# Patient Record
Sex: FEMALE | Race: WHITE | NOT HISPANIC OR LATINO | Employment: OTHER | URBAN - METROPOLITAN AREA
[De-identification: names, ages, dates, MRNs, and addresses within clinical notes are randomized per-mention and may not be internally consistent; named-entity substitution may affect disease eponyms.]

---

## 2016-11-16 LAB — TSH SERPL-ACNC: 4.29 UIU/ML (ref 0.36–3.74)

## 2017-05-01 ENCOUNTER — TRANSFERRED RECORDS (OUTPATIENT)
Dept: HEALTH INFORMATION MANAGEMENT | Facility: CLINIC | Age: 67
End: 2017-05-01

## 2017-05-26 ENCOUNTER — TRANSFERRED RECORDS (OUTPATIENT)
Dept: HEALTH INFORMATION MANAGEMENT | Facility: CLINIC | Age: 67
End: 2017-05-26

## 2018-02-07 ENCOUNTER — TRANSFERRED RECORDS (OUTPATIENT)
Dept: HEALTH INFORMATION MANAGEMENT | Facility: CLINIC | Age: 68
End: 2018-02-07

## 2018-02-07 LAB — TSH SERPL-ACNC: 5.42 UIU/ML (ref 0.36–3.74)

## 2018-07-06 NOTE — PROGRESS NOTES
SUBJECTIVE:   Erinn Zamudio is a 68 year old female who presents for Preventive Visit.      Are you in the first 12 months of your Medicare coverage?  No    Physical   Annual:     Getting at least 3 servings of Calcium per day:  NO    Bi-annual eye exam:  Yes    Dental care twice a year:  Yes    Sleep apnea or symptoms of sleep apnea:  None    Diet:  Regular (no restrictions)    Frequency of exercise:  6-7 days/week    Duration of exercise:  30-45 minutes    Taking medications regularly:  Not Applicable    Additional concerns today:  No    Ability to successfully perform activities of daily living: no assistance needed    Home Safety:  No safety concerns identified    Hearing Impairment: difficulty understanding soft or whispered speech        Fall risk:  Fallen 2 or more times in the past year?: No  Any fall with injury in the past year?: No    COGNITIVE SCREEN  1) Repeat 3 items (Leader, Season, Table)    2) Clock draw: NORMAL  3) 3 item recall: Recalls 3 objects  Results: 3 items recalled: COGNITIVE IMPAIRMENT LESS LIKELY    Mini-CogTM Copyright KERRY Pacheco. Licensed by the author for use in [facility]; reprinted with permission (salty@[facility]). All rights reserved.        Reviewed and updated as needed this visit by clinical staff  Tobacco  Allergies  Meds  Problems  Med Hx  Surg Hx  Fam Hx  Soc Hx          Reviewed and updated as needed this visit by Provider  Allergies  Meds  Problems        Social History   Substance Use Topics     Smoking status: Never Smoker     Smokeless tobacco: Never Used     Alcohol use No       No flowsheet data found.No flowsheet data found.            Today's PHQ-2 Score:   PHQ-2 ( 1999 Pfizer) 3/7/2016   Q1: Little interest or pleasure in doing things 0   Q2: Feeling down, depressed or hopeless 0   PHQ-2 Score 0       Do you feel safe in your environment - Yes    Do you have a Health Care Directive?: No: Advance care planning reviewed with patient; information given  to patient to review.    Current providers sharing in care for this patient include:   Patient Care Team:  Brittnee Ayala MD as PCP - General    The following health maintenance items are reviewed in Epic and correct as of today:  Health Maintenance   Topic Date Due     TETANUS IMMUNIZATION (SYSTEM ASSIGNED)  01/27/1968     MAMMO SCREEN Q2 YR (SYSTEM ASSIGNED)  01/27/2000     FALL RISK ASSESSMENT  01/27/2015     DEXA SCAN SCREENING (SYSTEM ASSIGNED)  01/27/2015     PNEUMOCOCCAL (1 of 2 - PCV13) 01/27/2015     LIPID SCREEN Q5 YR FEMALE (SYSTEM ASSIGNED)  11/01/2016     PHQ-2 Q1 YR  03/07/2017     INFLUENZA VACCINE (1) 09/01/2018     ADVANCE DIRECTIVE PLANNING Q5 YRS  12/16/2018     COLON CANCER SCREEN (SYSTEM ASSIGNED)  09/13/2023     HEPATITIS C SCREENING  Completed     BP Readings from Last 3 Encounters:   07/11/18 118/82   03/07/16 140/70   01/25/16 126/76    Wt Readings from Last 3 Encounters:   07/11/18 155 lb 8 oz (70.5 kg)   03/07/16 148 lb 14.4 oz (67.5 kg)   01/25/16 146 lb 11.2 oz (66.5 kg)                    Pneumonia Vaccine:Adults age 65+ who have not received previous Pneumovax (PPSV23) or PCV13 as an adult: Should first be given PCV13 AND then should be given PPSV23 6-12 months after PCV13    Review of Systems  CONSTITUTIONAL: NEGATIVE for fever, chills, change in weight  INTEGUMENTARY/SKIN: NEGATIVE for worrisome rashes, moles or lesions  EYES: NEGATIVE for vision changes or irritation  ENT/MOUTH: NEGATIVE for ear, mouth and throat problems  RESP: NEGATIVE for significant cough or SOB  BREAST: NEGATIVE for masses, tenderness or discharge  CV: NEGATIVE for chest pain, palpitations or peripheral edema  GI: NEGATIVE for nausea, abdominal pain, heartburn, or change in bowel habits  : NEGATIVE for frequency, dysuria, or hematuria  MUSCULOSKELETAL: NEGATIVE for significant arthralgias or myalgia  NEURO: NEGATIVE for weakness, dizziness or paresthesias  ENDOCRINE: NEGATIVE for temperature intolerance,  "skin/hair changes  HEME: NEGATIVE for bleeding problems  PSYCHIATRIC: NEGATIVE for changes in mood or affect    OBJECTIVE:   /82  Pulse 92  Temp 97.1  F (36.2  C) (Temporal)  Resp 16  Ht 5' 4.17\" (1.63 m)  Wt 155 lb 8 oz (70.5 kg)  BMI 26.55 kg/m2 Estimated body mass index is 26.55 kg/(m^2) as calculated from the following:    Height as of this encounter: 5' 4.17\" (1.63 m).    Weight as of this encounter: 155 lb 8 oz (70.5 kg).  Physical Exam  GENERAL APPEARANCE: healthy, alert and no distress  EYES: Eyes grossly normal to inspection, PERRL and conjunctivae and sclerae normal  HENT: ear canals and TM's normal, nose and mouth without ulcers or lesions, oropharynx clear and oral mucous membranes moist  NECK: no adenopathy, no asymmetry, masses, or scars and thyroid normal to palpation  RESP: lungs clear to auscultation - no rales, rhonchi or wheezes  BREAST: declined. Done with her ob/gyn  CV: regular rate and rhythm, normal S1 S2, no S3 or S4, no murmur, click or rub, no peripheral edema and peripheral pulses strong  ABDOMEN: soft, nontender, no hepatosplenomegaly, no masses and bowel sounds normal   (female): declined. See ob/gyn  MS: no musculoskeletal defects are noted and gait is age appropriate without ataxia  SKIN: no suspicious lesions or rashes  NEURO: Normal strength and tone, sensory exam grossly normal, mentation intact and speech normal  PSYCH: mentation appears normal and affect normal/bright    Diagnostic Test Results:  Results for orders placed or performed in visit on 07/11/18 (from the past 24 hour(s))   CBC with platelets differential   Result Value Ref Range    WBC 5.1 4.0 - 11.0 10e9/L    RBC Count 4.54 3.8 - 5.2 10e12/L    Hemoglobin 13.7 11.7 - 15.7 g/dL    Hematocrit 41.5 35.0 - 47.0 %    MCV 91 78 - 100 fl    MCH 30.2 26.5 - 33.0 pg    MCHC 33.0 31.5 - 36.5 g/dL    RDW 13.6 10.0 - 15.0 %    Platelet Count 233 150 - 450 10e9/L    Diff Method Automated Method     % Neutrophils 54.4 " "%    % Lymphocytes 30.1 %    % Monocytes 11.8 %    % Eosinophils 3.1 %    % Basophils 0.6 %    Absolute Neutrophil 2.8 1.6 - 8.3 10e9/L    Absolute Lymphocytes 1.5 0.8 - 5.3 10e9/L    Absolute Monocytes 0.6 0.0 - 1.3 10e9/L    Absolute Eosinophils 0.2 0.0 - 0.7 10e9/L    Absolute Basophils 0.0 0.0 - 0.2 10e9/L       ASSESSMENT / PLAN:   1. Medicare annual wellness visit, initial  See counseling messages     2. Lipid screening  Awaiting results. Will notify of results.   - Lipid panel reflex to direct LDL Fasting    3. S/P partial thyroidectomy  Doing well. No concerns.     4. Screening for diabetes mellitus  Awaiting results. Will notify of results.   - Comprehensive metabolic panel (BMP + Alb, Alk Phos, ALT, AST, Total. Bili, TP)    5. Screening, anemia, deficiency, iron  Awaiting results. Will notify of results.   - CBC with platelets differential  - Iron and iron binding capacity  - Ferritin    6. Screening for thyroid disorder  Awaiting results. Will notify of results.   - **TSH with free T4 reflex FUTURE 1yr    7. Menopausal state  Discussed Dexa. Declines. \"I'm never going to take medication for it anyway. I already spoke with my gynecologist\"    8. Screening for condition  Awaiting results. Will notify of results.   - Comprehensive metabolic panel (BMP + Alb, Alk Phos, ALT, AST, Total. Bili, TP)    End of Life Planning:  Patient currently has an advanced directive: Yes.  Practitioner is supportive of decision.    COUNSELING:  Reviewed preventive health counseling, as reflected in patient instructions  Special attention given to:       Regular exercise       Healthy diet/nutrition       Osteoporosis Prevention/Bone Health    BP Readings from Last 1 Encounters:   07/11/18 118/82     Estimated body mass index is 26.55 kg/(m^2) as calculated from the following:    Height as of this encounter: 5' 4.17\" (1.63 m).    Weight as of this encounter: 155 lb 8 oz (70.5 kg).           reports that she has never smoked. She " has never used smokeless tobacco.      Appropriate preventive services were discussed with this patient, including applicable screening as appropriate for cardiovascular disease, diabetes, osteopenia/osteoporosis, and glaucoma.  As appropriate for age/gender, discussed screening for colorectal cancer, prostate cancer, breast cancer, and cervical cancer. Checklist reviewing preventive services available has been given to the patient.    Reviewed patients plan of care and provided an AVS. The Basic Care Plan (routine screening as documented in Health Maintenance) for Erinn meets the Care Plan requirement. This Care Plan has been established and reviewed with the Patient.    Counseling Resources:  ATP IV Guidelines  Pooled Cohorts Equation Calculator  Breast Cancer Risk Calculator  FRAX Risk Assessment  ICSI Preventive Guidelines  Dietary Guidelines for Americans, 2010  USDA's MyPlate  ASA Prophylaxis  Lung CA Screening    AAKASH TRUONG MD, MD  [facility]

## 2018-07-11 ENCOUNTER — OFFICE VISIT (OUTPATIENT)
Dept: FAMILY MEDICINE | Facility: CLINIC | Age: 68
End: 2018-07-11
Payer: COMMERCIAL

## 2018-07-11 VITALS
TEMPERATURE: 97.1 F | RESPIRATION RATE: 16 BRPM | SYSTOLIC BLOOD PRESSURE: 118 MMHG | HEART RATE: 92 BPM | BODY MASS INDEX: 26.55 KG/M2 | DIASTOLIC BLOOD PRESSURE: 82 MMHG | HEIGHT: 64 IN | WEIGHT: 155.5 LBS

## 2018-07-11 DIAGNOSIS — Z13.29 SCREENING FOR THYROID DISORDER: ICD-10-CM

## 2018-07-11 DIAGNOSIS — Z13.0 SCREENING, ANEMIA, DEFICIENCY, IRON: ICD-10-CM

## 2018-07-11 DIAGNOSIS — Z00.00 MEDICARE ANNUAL WELLNESS VISIT, INITIAL: Primary | ICD-10-CM

## 2018-07-11 DIAGNOSIS — N95.1 MENOPAUSAL STATE: ICD-10-CM

## 2018-07-11 DIAGNOSIS — Z13.9 SCREENING FOR CONDITION: ICD-10-CM

## 2018-07-11 DIAGNOSIS — Z13.220 LIPID SCREENING: ICD-10-CM

## 2018-07-11 DIAGNOSIS — E89.0 S/P PARTIAL THYROIDECTOMY: ICD-10-CM

## 2018-07-11 DIAGNOSIS — Z13.1 SCREENING FOR DIABETES MELLITUS: ICD-10-CM

## 2018-07-11 LAB
ALBUMIN SERPL-MCNC: 3.7 G/DL (ref 3.4–5)
ALP SERPL-CCNC: 82 U/L (ref 40–150)
ALT SERPL W P-5'-P-CCNC: 35 U/L (ref 0–50)
ANION GAP SERPL CALCULATED.3IONS-SCNC: 7 MMOL/L (ref 3–14)
AST SERPL W P-5'-P-CCNC: 29 U/L (ref 0–45)
BASOPHILS # BLD AUTO: 0 10E9/L (ref 0–0.2)
BASOPHILS NFR BLD AUTO: 0.6 %
BILIRUB SERPL-MCNC: 0.5 MG/DL (ref 0.2–1.3)
BUN SERPL-MCNC: 16 MG/DL (ref 7–30)
CALCIUM SERPL-MCNC: 8.7 MG/DL (ref 8.5–10.1)
CHLORIDE SERPL-SCNC: 107 MMOL/L (ref 94–109)
CHOLEST SERPL-MCNC: 247 MG/DL
CO2 SERPL-SCNC: 26 MMOL/L (ref 20–32)
CREAT SERPL-MCNC: 0.72 MG/DL (ref 0.52–1.04)
DIFFERENTIAL METHOD BLD: NORMAL
EOSINOPHIL # BLD AUTO: 0.2 10E9/L (ref 0–0.7)
EOSINOPHIL NFR BLD AUTO: 3.1 %
ERYTHROCYTE [DISTWIDTH] IN BLOOD BY AUTOMATED COUNT: 13.6 % (ref 10–15)
FERRITIN SERPL-MCNC: 73 NG/ML (ref 8–252)
GFR SERPL CREATININE-BSD FRML MDRD: 81 ML/MIN/1.7M2
GLUCOSE SERPL-MCNC: 92 MG/DL (ref 70–99)
HCT VFR BLD AUTO: 41.5 % (ref 35–47)
HDLC SERPL-MCNC: 50 MG/DL
HGB BLD-MCNC: 13.7 G/DL (ref 11.7–15.7)
IRON SATN MFR SERPL: 30 % (ref 15–46)
IRON SERPL-MCNC: 102 UG/DL (ref 35–180)
LDLC SERPL CALC-MCNC: 167 MG/DL
LYMPHOCYTES # BLD AUTO: 1.5 10E9/L (ref 0.8–5.3)
LYMPHOCYTES NFR BLD AUTO: 30.1 %
MCH RBC QN AUTO: 30.2 PG (ref 26.5–33)
MCHC RBC AUTO-ENTMCNC: 33 G/DL (ref 31.5–36.5)
MCV RBC AUTO: 91 FL (ref 78–100)
MONOCYTES # BLD AUTO: 0.6 10E9/L (ref 0–1.3)
MONOCYTES NFR BLD AUTO: 11.8 %
NEUTROPHILS # BLD AUTO: 2.8 10E9/L (ref 1.6–8.3)
NEUTROPHILS NFR BLD AUTO: 54.4 %
NONHDLC SERPL-MCNC: 197 MG/DL
PLATELET # BLD AUTO: 233 10E9/L (ref 150–450)
POTASSIUM SERPL-SCNC: 4.8 MMOL/L (ref 3.4–5.3)
PROT SERPL-MCNC: 7.6 G/DL (ref 6.8–8.8)
RBC # BLD AUTO: 4.54 10E12/L (ref 3.8–5.2)
SODIUM SERPL-SCNC: 140 MMOL/L (ref 133–144)
TIBC SERPL-MCNC: 338 UG/DL (ref 240–430)
TRIGL SERPL-MCNC: 152 MG/DL
TSH SERPL DL<=0.005 MIU/L-ACNC: 3.74 MU/L (ref 0.4–4)
WBC # BLD AUTO: 5.1 10E9/L (ref 4–11)

## 2018-07-11 PROCEDURE — 84443 ASSAY THYROID STIM HORMONE: CPT | Performed by: FAMILY MEDICINE

## 2018-07-11 PROCEDURE — 80061 LIPID PANEL: CPT | Performed by: FAMILY MEDICINE

## 2018-07-11 PROCEDURE — 83550 IRON BINDING TEST: CPT | Performed by: FAMILY MEDICINE

## 2018-07-11 PROCEDURE — 82728 ASSAY OF FERRITIN: CPT | Performed by: FAMILY MEDICINE

## 2018-07-11 PROCEDURE — 83540 ASSAY OF IRON: CPT | Performed by: FAMILY MEDICINE

## 2018-07-11 PROCEDURE — 85025 COMPLETE CBC W/AUTO DIFF WBC: CPT | Performed by: FAMILY MEDICINE

## 2018-07-11 PROCEDURE — 80053 COMPREHEN METABOLIC PANEL: CPT | Performed by: FAMILY MEDICINE

## 2018-07-11 PROCEDURE — 99397 PER PM REEVAL EST PAT 65+ YR: CPT | Performed by: FAMILY MEDICINE

## 2018-07-11 PROCEDURE — 36415 COLL VENOUS BLD VENIPUNCTURE: CPT | Performed by: FAMILY MEDICINE

## 2018-07-11 ASSESSMENT — ACTIVITIES OF DAILY LIVING (ADL): CURRENT_FUNCTION: NO ASSISTANCE NEEDED

## 2018-07-11 ASSESSMENT — PAIN SCALES - GENERAL: PAINLEVEL: NO PAIN (0)

## 2018-07-11 NOTE — MR AVS SNAPSHOT
After Visit Summary   7/11/2018    Erinn Zamudio    MRN: [id number]           Patient Information     Date Of Birth          1950        Visit Information        Provider Department      7/11/2018 9:00 AM Brittnee Ayala MD [facility]        Today's Diagnoses     Medicare annual wellness visit, initial    -  1    Lipid screening        S/P partial thyroidectomy        Screening for diabetes mellitus        Screening, anemia, deficiency, iron        Screening for thyroid disorder        Menopausal state        Screening for condition          Care Instructions      Preventive Health Recommendations    Female Ages 65 +    Yearly exam:     See your health care provider every year in order to  o Review health changes.   o Discuss preventive care.    o Review your medicines if your doctor has prescribed any.      You no longer need a yearly Pap test unless you've had an abnormal Pap test in the past 10 years. If you have vaginal symptoms, such as bleeding or discharge, be sure to talk with your provider about a Pap test.      Every 1 to 2 years, have a mammogram.  If you are over 69, talk with your health care provider about whether or not you want to continue having screening mammograms.      Every 10 years, have a colonoscopy. Or, have a yearly FIT test (stool test). These exams will check for colon cancer.       Have a cholesterol test every 5 years, or more often if your doctor advises it.       Have a diabetes test (fasting glucose) every three years. If you are at risk for diabetes, you should have this test more often.       At age 65, have a bone density scan (DEXA) to check for osteoporosis (brittle bone disease).    Shots:    Get a flu shot each year.    Get a tetanus shot every 10 years.    Talk to your doctor about your pneumonia vaccines. There are now two you should receive - Pneumovax (PPSV 23) and Prevnar (PCV 13).    Talk to your pharmacist about the shingles  vaccine.    Talk to your doctor about the hepatitis B vaccine.    Nutrition:     Eat at least 5 servings of fruits and vegetables each day.      Eat whole-grain bread, whole-wheat pasta and brown rice instead of white grains and rice.      Get adequate Calcium and Vitamin D.     Lifestyle    Exercise at least 150 minutes a week (30 minutes a day, 5 days a week). This will help you control your weight and prevent disease.      Limit alcohol to one drink per day.      No smoking.       Wear sunscreen to prevent skin cancer.       See your dentist twice a year for an exam and cleaning.      See your eye doctor every 1 to 2 years to screen for conditions such as glaucoma, macular degeneration and cataracts.          Follow-ups after your visit        Follow-up notes from your care team     Return in about 1 year (around 7/11/2019) for Physical Exam.      Who to contact     If you have questions or need follow up information about today's clinic visit or your schedule please contact [facility] directly at [phone].  Normal or non-critical lab and imaging results will be communicated to you by [employer], letter or phone within 4 business days after the clinic has received the results. If you do not hear from us within 7 days, please contact the clinic through [email or web address] or phone. If you have a critical or abnormal lab result, we will notify you by phone as soon as possible.  Submit refill requests through [email or web address] or call your pharmacy and they will forward the refill request to us. Please allow 3 business days for your refill to be completed.          Additional Information About Your Visit        [employer] Information     [email or web address] gives you secure access to your electronic health record. If you see a primary care provider, you can also send messages to your care team and make appointments. If you have questions, please call your primary care clinic.  If you do not have a primary care provider, please call  "[phone] and they will assist you.        Care EveryWhere ID     This is your Care EveryWhere ID. This could be used by other organizations to access your [place] medical records  [id number]        Your Vitals Were     Pulse Temperature Respirations Height BMI (Body Mass Index)       92 97.1  F (36.2  C) (Temporal) 16 5' 4.17\" (1.63 m) 26.55 kg/m2        Blood Pressure from Last 3 Encounters:   07/11/18 118/82   03/07/16 140/70   01/25/16 126/76    Weight from Last 3 Encounters:   07/11/18 155 lb 8 oz (70.5 kg)   03/07/16 148 lb 14.4 oz (67.5 kg)   01/25/16 146 lb 11.2 oz (66.5 kg)              We Performed the Following     **TSH with free T4 reflex FUTURE 1yr     CBC with platelets differential     Comprehensive metabolic panel (BMP + Alb, Alk Phos, ALT, AST, Total. Bili, TP)     Ferritin     Iron and iron binding capacity     Lipid panel reflex to direct LDL Fasting          Today's Medication Changes          These changes are accurate as of 7/11/18  9:30 AM.  If you have any questions, ask your nurse or doctor.               Stop taking these medicines if you haven't already. Please contact your care team if you have questions.     omeprazole 20 MG tablet   Stopped by:  Brittnee Ayala MD                    Primary Care Provider Office Phone # Fax #    Brittnee Ayala [phone] [address] [facility] [address]        Equal Access to Services     [facility]: Hadii aad ku hadasho Soomaali, waaxda luqadaha, qaybta kaalmada adeegyada, rubina hong . So [facility] [phone].    ATENCIÓN: Si habla español, tiene a xiong disposición servicios gratuitos de asistencia lingüística. Llame al [phone].    We comply with applicable federal civil rights laws and [place] laws. We do not discriminate on the basis of race, color, national origin, age, disability, sex, sexual orientation, or gender identity.            Thank you!     Thank you for choosing " [facility]  for your care. Our goal is always to provide you with excellent care. Hearing back from our patients is one way we can continue to improve our services. Please take a few minutes to complete the written survey that you may receive in the mail after your visit with us. Thank you!             Your Updated Medication List - Protect others around you: Learn how to safely use, store and throw away your medicines at www.disposemymeds.org.          This list is accurate as of 7/11/18  9:30 AM.  Always use your most recent med list.                   Brand Name Dispense Instructions for use Diagnosis    B COMPLEX + C TR PO      Take  by mouth.        Cod Liver Oil 10 MINIM Caps      Take  by mouth.        MULTIVITAMIN ADULT PO

## 2018-08-03 PROBLEM — E89.0 S/P PARTIAL THYROIDECTOMY: Status: ACTIVE | Noted: 2018-08-03

## 2018-08-10 ENCOUNTER — TELEPHONE (OUTPATIENT)
Dept: FAMILY MEDICINE | Facility: CLINIC | Age: 68
End: 2018-08-10

## 2018-08-10 NOTE — TELEPHONE ENCOUNTER
Please abstract the following data from this visit with this patient into the appropriate field in Epic:    Mammogram done on this date: 05/2017 (approximately), by this group: [place] [facility], results were normal.     Stacy Fitzpatrick      Panel Management Review      Patient has the following on her problem list: None      Composite cancer screening  Chart review shows that this patient is due/due soon for the following Mammogram  Summary:    Patient is due/failing the following:   MAMMOGRAM    Action needed:   Schedule a mammogram     Type of outreach:    called [facility] in [place]. mammogram last completed on 05/2017 and was normal     Questions for provider review:    None                                                                                                                                    Stacy Fitzpatrick     Chart routed to Care Team .

## 2018-09-27 ENCOUNTER — TELEPHONE (OUTPATIENT)
Dept: FAMILY MEDICINE | Facility: CLINIC | Age: 68
End: 2018-09-27

## 2018-09-27 NOTE — TELEPHONE ENCOUNTER
Please see if willing to do an Evisit - can address today while out of the office. Can also do telephone visit tomorrow afternoon.

## 2018-09-27 NOTE — TELEPHONE ENCOUNTER
Reason for Call:  Same Day Appointment, Requested Provider:  Brittnee Ayala MD    PCP: Brittnee Ayala    Reason for visit: SINUS INFECTION ISSUE and pain in left eye    Duration of symptoms: 1 MONTH    Have you been treated for this in the past? No    Additional comments: Patient is scheduled for 10/3, but wants to be squeezed in sooner    Can we leave a detailed message on this number? YES    Phone number patient can be reached at: Home number on file [phone] (home) or Cell number on file:    Telephone Information:   Mobile [phone]       Best Time: any    Call taken on 9/27/2018 at 10:44 AM by Carline Ellsworth

## 2019-01-08 ENCOUNTER — OFFICE VISIT (OUTPATIENT)
Dept: FAMILY MEDICINE | Facility: CLINIC | Age: 69
End: 2019-01-08
Payer: COMMERCIAL

## 2019-01-08 VITALS
DIASTOLIC BLOOD PRESSURE: 80 MMHG | BODY MASS INDEX: 26.56 KG/M2 | HEART RATE: 108 BPM | WEIGHT: 155.6 LBS | OXYGEN SATURATION: 97 % | HEIGHT: 64 IN | TEMPERATURE: 97 F | RESPIRATION RATE: 16 BRPM | SYSTOLIC BLOOD PRESSURE: 134 MMHG

## 2019-01-08 DIAGNOSIS — R05.9 COUGH: ICD-10-CM

## 2019-01-08 DIAGNOSIS — J01.90 ACUTE SINUSITIS WITH SYMPTOMS > 10 DAYS: Primary | ICD-10-CM

## 2019-01-08 DIAGNOSIS — Z78.0 ASYMPTOMATIC POSTMENOPAUSAL STATUS: ICD-10-CM

## 2019-01-08 PROCEDURE — 99214 OFFICE O/P EST MOD 30 MIN: CPT | Performed by: PHYSICIAN ASSISTANT

## 2019-01-08 RX ORDER — METHYLPREDNISOLONE 4 MG
TABLET, DOSE PACK ORAL
Qty: 21 TABLET | Refills: 0 | Status: SHIPPED | OUTPATIENT
Start: 2019-01-08 | End: 2019-02-07

## 2019-01-08 RX ORDER — AMOXICILLIN 875 MG
875 TABLET ORAL 2 TIMES DAILY
Qty: 20 TABLET | Refills: 0 | Status: SHIPPED | OUTPATIENT
Start: 2019-01-08 | End: 2019-02-07

## 2019-01-08 RX ORDER — POLYMYXIN B SULFATE AND TRIMETHOPRIM 1; 10000 MG/ML; [USP'U]/ML
SOLUTION OPHTHALMIC
Refills: 0 | COMMUNITY
Start: 2018-09-12 | End: 2021-09-28

## 2019-01-08 ASSESSMENT — PAIN SCALES - GENERAL: PAINLEVEL: MILD PAIN (3)

## 2019-01-08 ASSESSMENT — MIFFLIN-ST. JEOR: SCORE: 1220.8

## 2019-01-08 NOTE — PROGRESS NOTES
"  SUBJECTIVE:   Erinn Zamudio is a 68 year old female who presents to clinic today for the following health issues:      HPI  Acute Illness   Acute illness concerns: URI   Onset: 2 weeks    Fever: no    Chills/Sweats: YES- sweats    Headache (location?): no    Sinus Pressure:YES    Conjunctivitis:  no    Ear Pain: YES: right    Rhinorrhea: no    Congestion: YES    Sore Throat: no     Cough: YES-non-productive    Wheeze: no    Decreased Appetite: no    Nausea: no    Vomiting: no    Diarrhea:  no    Dysuria/Freq.: no    Fatigue/Achiness: YES    Sick/Strep Exposure: YES- sick baby     Therapies Tried and outcome: ibuprofen/ aspirin : didn't really help  Amoxicillin script left over so she took it    Patient has been having URI symptoms for over 2 weeks and states that the symptoms have settled into her chest. She had fevers initially which have resolved but continues to have worsening chest congestion and a cough. She did have some Augmentin that she had not finished from September so did take 3 days of that which she states did not help.        Problem list and histories reviewed & adjusted, as indicated.  Additional history: as documented    BP Readings from Last 3 Encounters:   01/08/19 134/80   07/11/18 118/82   03/07/16 140/70    Wt Readings from Last 3 Encounters:   01/08/19 70.6 kg (155 lb 9.6 oz)   07/11/18 70.5 kg (155 lb 8 oz)   03/07/16 67.5 kg (148 lb 14.4 oz)                    ROS:  Constitutional, HEENT, cardiovascular, pulmonary, gi and gu systems are negative, except as otherwise noted.    OBJECTIVE:     /80   Pulse 108   Temp 97  F (36.1  C) (Temporal)   Resp 16   Ht 1.626 m (5' 4\")   Wt 70.6 kg (155 lb 9.6 oz)   SpO2 97%   BMI 26.71 kg/m    Body mass index is 26.71 kg/m .  GENERAL: healthy, alert and no distress  EYES: Eyes grossly normal to inspection  HENT: normal cephalic/atraumatic, both ears: clear effusion, rhinorrhea yellow, oropharynx clear, oral mucous membranes moist and sinuses: " maxillary tenderness on both sides  NECK: bilateral anterior cervical adenopathy  RESP: lungs clear to auscultation - no rales, rhonchi or wheezes  CV: regular rates and rhythm, no murmur, click or rub, peripheral pulses strong and no peripheral edema  MS: no gross musculoskeletal defects noted, no edema  SKIN: no suspicious lesions or rashes    Diagnostic Test Results:  none     ASSESSMENT/PLAN:       ICD-10-CM    1. Acute sinusitis with symptoms > 10 days J01.90 amoxicillin (AMOXIL) 875 MG tablet     methylPREDNISolone (MEDROL DOSEPAK) 4 MG tablet therapy pack   2. Asymptomatic postmenopausal status Z78.0 DEXA HIP/PELVIS/SPINE - Future   3. Cough R05 methylPREDNISolone (MEDROL DOSEPAK) 4 MG tablet therapy pack       I will treat for sinus infection and have her start a steroid pack if chest congestion is not improving with antibiotic. Follow up in clinic if new or worsening symptoms.   See Patient Instructions    Sofy Garcia PA-C  [facility]

## 2019-01-08 NOTE — PATIENT INSTRUCTIONS
I will treat you for your sinus infection with amoxicillin and have you use home cares and possibly some Mucinex to help loosen the cough. I would have you use a steroid pack if chest wall pain and follow up in clinic if any fevers or worsening cough.

## 2019-02-07 ENCOUNTER — OFFICE VISIT (OUTPATIENT)
Dept: FAMILY MEDICINE | Facility: CLINIC | Age: 69
End: 2019-02-07
Payer: COMMERCIAL

## 2019-02-07 ENCOUNTER — ANCILLARY PROCEDURE (OUTPATIENT)
Dept: GENERAL RADIOLOGY | Facility: CLINIC | Age: 69
End: 2019-02-07
Attending: PHYSICIAN ASSISTANT
Payer: COMMERCIAL

## 2019-02-07 VITALS
BODY MASS INDEX: 26.26 KG/M2 | SYSTOLIC BLOOD PRESSURE: 150 MMHG | DIASTOLIC BLOOD PRESSURE: 88 MMHG | TEMPERATURE: 98.5 F | WEIGHT: 153 LBS | OXYGEN SATURATION: 97 % | HEART RATE: 92 BPM

## 2019-02-07 DIAGNOSIS — R05.9 COUGH: ICD-10-CM

## 2019-02-07 DIAGNOSIS — J20.9 ACUTE BRONCHITIS WITH SYMPTOMS > 10 DAYS: Primary | ICD-10-CM

## 2019-02-07 DIAGNOSIS — R07.89 CHEST TIGHTNESS: ICD-10-CM

## 2019-02-07 PROCEDURE — 71046 X-RAY EXAM CHEST 2 VIEWS: CPT | Mod: FY

## 2019-02-07 PROCEDURE — 99214 OFFICE O/P EST MOD 30 MIN: CPT | Performed by: PHYSICIAN ASSISTANT

## 2019-02-07 PROCEDURE — 93000 ELECTROCARDIOGRAM COMPLETE: CPT | Performed by: PHYSICIAN ASSISTANT

## 2019-02-07 RX ORDER — ALBUTEROL SULFATE 90 UG/1
2 AEROSOL, METERED RESPIRATORY (INHALATION) EVERY 6 HOURS
Qty: 1 INHALER | Refills: 0 | Status: SHIPPED | OUTPATIENT
Start: 2019-02-07 | End: 2019-11-13

## 2019-02-07 RX ORDER — INHALER, ASSIST DEVICES
SPACER (EA) MISCELLANEOUS
Qty: 1 EACH | Refills: 0 | Status: SHIPPED | OUTPATIENT
Start: 2019-02-07 | End: 2019-11-13

## 2019-02-07 ASSESSMENT — PAIN SCALES - GENERAL: PAINLEVEL: NO PAIN (0)

## 2019-02-07 NOTE — PROGRESS NOTES
"  SUBJECTIVE:   Erinn Zamudio is a 69 year old female who presents to clinic today for the following health issues:      HPI  Acute Illness   Acute illness concerns: cough   Onset: \"got the cough back about 1 week ago.\"   Was seen by Sofy Garcia 01/08/2019. Treated with amoxicillin. Felt better for a few weeks but continued to have a \"lingering annoying dry cough\".   Then 1 week ago woke with just a deep, loose cough. Couldn't get anything up. Metallic taste. Can hear it in chest. Feels like sinuses draining in back of throat. Cold air makes it worse  No fevers/chills/body aches. Some chest discomfort/tightness last night and today- upper chest.  Little SOB, little SOB with stairs at home. Got worried on drive over could be heart related.   Doesn't like to take medications.  Never smoker.  No asthma hx    Fever: no    Chills/Sweats: no    Headache (location?): no    Sinus Pressure:YES- post-nasal drainage \"metal taste in my mouth \"     Conjunctivitis:  no    Ear Pain: no    Rhinorrhea: no     Congestion: YES chest    Sore Throat: no      Cough: YES-non-productive, pressure in chest \"muscle pain\"     Wheeze: no     Decreased Appetite: no    Nausea: no     Vomiting: no     Diarrhea:  no     Dysuria/Freq.: no     Fatigue/Achiness: no     Sick/Strep Exposure: no     No swelling in legs. No rashes. No GI sx.        Therapies Tried and outcome: vicks, cough drops, pressure     Problem list and histories reviewed & adjusted, as indicated.  Additional history: as documented    Patient Active Problem List   Diagnosis     CARDIOVASCULAR SCREENING; LDL GOAL LESS THAN 160     Advanced directives, counseling/discussion     S/P partial thyroidectomy     Past Surgical History:   Procedure Laterality Date     BREAST SURGERY       CARDIAC SURGERY       COLONOSCOPY  9/13/2013    Procedure: COLONOSCOPY;  Screening Colon   Brittnee Ayala;  Surgeon: Brittnee Ayala MD;  Location: [facility] OR     partial thyroidectomy  2017     SURGICAL " "HISTORY OF -       left breast, cystectomy       Social History     Tobacco Use     Smoking status: Never Smoker     Smokeless tobacco: Never Used   Substance Use Topics     Alcohol use: No     Family History   Problem Relation Age of Onset     Cerebrovascular Disease Mother          at 59 with brain anyerism     C.A.D. Father      Genitourinary Problems Daughter         kidney transplant     Asthma No family hx of      Diabetes No family hx of      Hypertension No family hx of      Breast Cancer No family hx of      Cancer - colorectal No family hx of      Prostate Cancer No family hx of          Current Outpatient Medications   Medication Sig Dispense Refill     Cod Liver Oil 10 MINIM CAPS Take  by mouth.       Multiple Vitamins-Minerals (MULTIVITAMIN ADULT PO)        B Complex-C-Folic Acid (B COMPLEX + C TR PO) Take  by mouth.       trimethoprim-polymyxin b (POLYTRIM) 13665-4.1 UNIT/ML-% ophthalmic solution INSTILL 1 DROP INTO BOTH EYES 4 TIMES A DAY FOR 7 DAYS  0     Allergies   Allergen Reactions     Erythromycin      \"not feeling good\"     BP Readings from Last 3 Encounters:   19 150/88   19 134/80   18 118/82    Wt Readings from Last 3 Encounters:   19 69.4 kg (153 lb)   19 70.6 kg (155 lb 9.6 oz)   18 70.5 kg (155 lb 8 oz)                  Labs reviewed in EPIC    ROS:  Constitutional, HEENT, cardiovascular, pulmonary, gi and gu systems are negative, except as otherwise noted.    OBJECTIVE:     /88   Pulse 92   Temp 98.5  F (36.9  C) (Oral)   Wt 69.4 kg (153 lb)   SpO2 97%   BMI 26.26 kg/m    Body mass index is 26.26 kg/m .  GENERAL:mildly ill, alert and no distress, nontoxic, no respiratory distress  EYES: Eyes grossly normal to inspection, PERRL and conjunctivae and sclerae normal  HENT: Normal cephalic/atraumatic. Sinuses nontender. Ear canals clear and TM's normal, no effusion. Nose mucosa no erythema and turbinates normal. Lips normal, no lesions. " Buccal muscosa moist. Soft palate no lesions or ulcers. Tonsilar archs no erythema, tonsils normal, no exudates or erythema. Uvula midline. Posterior oropharynx normal erythema.   NECK: no adenopathy and no asymmetry, masses, or scars  RESP: deep, bronchial barking productive cough, subtle rhonchi - no rales or wheezes  CV: regular rate and rhythm, normal S1 S2, no S3 or S4, no murmur, click or rub  MSK: no LE edema, calf tenderness, normal pedal pulses  SKIN: no suspicious lesions or rashes      Diagnostic Test Results:  Xray -   Xr Chest 2 Views  Result Date: 2/7/2019  XR CHEST 2 VW   2/7/2019 1:59 PM HISTORY: Cough COMPARISON: 3/31/2016   IMPRESSION: No definite acute abnormality. SHAYY WIGGINS MD    EKG: NST, no st segment changes, no ectopy. compared to EKG from 03/07/2016- stable    ASSESSMENT/PLAN:     1. Acute bronchitis with symptoms > 10 days  Pt on amoxicillin within the past 30days. Preference would be for doxy or alternate family of meds. Pt sensitive to abx and does well w/amox and augmentin and requests starting w/augmentin.   Albuterol for cough, chest sx.  Follow up if sx not starting to improve early next week. Sooner if worsening.   - amoxicillin-clavulanate (AUGMENTIN) 875-125 MG tablet; Take 1 tablet by mouth 2 times daily for 10 days  Dispense: 20 tablet; Refill: 0  - albuterol (PROAIR HFA/PROVENTIL HFA/VENTOLIN HFA) 108 (90 Base) MCG/ACT inhaler; Inhale 2 puffs into the lungs every 6 hours  Dispense: 1 Inhaler; Refill: 0  - spacer (OPTICHAMBER CHELSEA) holding chamber; To be used with albuterol inhaler  Dispense: 1 each; Refill: 0    2. Cough  - XR Chest 2 Views; Future  - EKG 12-lead complete w/read - Clinics    3. Chest tightness  - EKG 12-lead complete w/read - Clinics    Follow Up: The patient was instructed to contact clinic for worsening symptoms, non-improvement as expected/discussed, and for questions regarding medications or treatment plan. Discussed parameters for follow up and  included in After Visit Summary given to patient.      Reena Gurrola PA-C  [facility]

## 2019-02-07 NOTE — PATIENT INSTRUCTIONS
Augmentin twice daily x 10 days  Take with food    Follow up early next week if not improving as expected    Inhaler- albuterol- helps relax airway to help with cough, chest tightness feeling.  2 puffs (use spacer) every 4-6 hrs for these symptoms      To be seen again urgently - if new fever 100.4 or higher, worsening shortness of breath, chest pain.

## 2019-02-19 ENCOUNTER — VIRTUAL VISIT (OUTPATIENT)
Dept: FAMILY MEDICINE | Facility: CLINIC | Age: 69
End: 2019-02-19
Payer: COMMERCIAL

## 2019-02-19 ENCOUNTER — TELEPHONE (OUTPATIENT)
Dept: FAMILY MEDICINE | Facility: CLINIC | Age: 69
End: 2019-02-19

## 2019-02-19 DIAGNOSIS — J01.00 SUBACUTE MAXILLARY SINUSITIS: Primary | ICD-10-CM

## 2019-02-19 PROCEDURE — 99442 ZZC PHYSICIAN TELEPHONE EVALUATION 11-20 MIN: CPT | Performed by: FAMILY MEDICINE

## 2019-02-19 NOTE — TELEPHONE ENCOUNTER
When I saw her 02/07/19 she was also having a lot of cough and chest sx (we even did chest xray and ekg)- that visit was more chest sx than sinus even at that time. She should prob have phone or office visit. Reena Gurrola PA-C

## 2019-02-19 NOTE — TELEPHONE ENCOUNTER
"Erinn Zamudio is a 69 year old female who calls with possible sinus infection.    NURSING ASSESSMENT:  Description:  Pt states her upper teeth were bothering her, mainly 1 tooth is more sensitive then the others.  Went to the dentist. Had x-ray done at dentist and they told her to call doctor, xray showed issues with her sinuses and not her teeth. Does not feel congested. Just has sinus pressure. No fevers. No swelling. No HA. Discomfort gets worse when she bends over. When she lays down at night it is better. Feels sensitivity of her tooth mainly  Onset/duration:  Couple of days  Precip. factors:  Recently had Bronchitis  Associated symptoms:  Cough is gone, states it went away quickly after LOV 2/7/2019  Improves/worsens symptoms:  Has been doing saline rinses and steam  Pain scale (0-10)   Tooth sensitivity    Allergies:   Allergies   Allergen Reactions     Erythromycin      \"not feeling good\"     NURSING PLAN: Patient meets virtual visit criteria:  Yes,  Routed to schedule phone visit.    RECOMMENDED DISPOSITION:  Phone Visit  Will comply with recommendation: Yes  If further questions/concerns or if symptoms do not improve, worsen or new symptoms develop, call your PCP or [facility] as soon as possible.      Guideline used: Sinus problems  Telephone Triage Protocols for Nurses, Fifth Edition, Naya Alvarez RN    "

## 2019-02-19 NOTE — PROGRESS NOTES
Erinn Zamudio is a 69 year old female who is being evaluated via a billable telephone visit.      The patient has been notified of following:         Consent has been obtained for this service by 1 care team member: yes. See the scanned image in the medical record.    Erinn Zamudio complains of    Chief Complaint   Patient presents with     Sinus Problem       I have reviewed and updated the patient's Past Medical History, Social History, Family History and Medication List.    ALLERGIES  Erythromycin    Sammie Douglass MA    (MA signature)      Spoke with Erinn.  In January, had cough and cold. Treated for sinus infection. Had been around a grandchild.  Took amoxicillin. Didn't finish the course. Had felt better.   At the end of January, started with cough - loose.   Has not been getting better.   Thought to be bronchitis and was given augmentin and an inhaler.   Didn't like the inhaler - caused jitteriness. Used it once  Finished the augmentin.   Cough is now gone.     Feels like sinuses are irritated again. Has some discomfort upper teeth.   Saw the dentist and her dentist said she has sinusitis - left above the teeth.     She is wondering if she needs additional antibiotic.     Reports no plugging of her nose.   No fever.   Thick white nasal discharge.       Assessment/Plan:  (J01.00) Subacute maxillary sinusitis  (primary encounter diagnosis)  Augmentin 875 mg twice daily for another 10 days  Comment: Patient with symptoms consistent with sinusitis with known history of sinus infections. Patient has had symptoms for more than 10 days and has tried over the counter therapy appropriately and without improvement.   Recommend treatment with antibiotics as noted.   Continue to push fluids and rest.   Flonase nasal spray once daily.   Nasal saline rinses.   Recheck if fails to improve or if worsening in any way.     All questions invited, asked and answered to the patient's apparent satisfaction.  Patient agrees to plan.          Total time of call between patient and provider was 13 minutes

## 2019-02-19 NOTE — TELEPHONE ENCOUNTER
Spoke with patient.  Nothing is really different from when she was seen on 02/07/2019.  Went to the  dentist this am for tooth pain and feels pressure with sinus.  They did an Xray that showed the sinus are very full and white and the issues was not her teeth.  They advised she call the clinic for antibiotic.  Wondering if she can get another round of antibiotics?  States that she's getting thick and sticky white mucus.       Has been doing saline rinses and trying steam.  Does have an old Flonase but has not tried that.  States that she does not like taking OTC so has not tried a decongestant either.     [facility] in [place]    Please advise on plan or OV.    Juan Dennison, RN, BSN

## 2019-04-03 ENCOUNTER — TELEPHONE (OUTPATIENT)
Dept: FAMILY MEDICINE | Facility: CLINIC | Age: 69
End: 2019-04-03

## 2019-04-03 ENCOUNTER — OFFICE VISIT (OUTPATIENT)
Dept: FAMILY MEDICINE | Facility: CLINIC | Age: 69
End: 2019-04-03
Payer: COMMERCIAL

## 2019-04-03 VITALS
BODY MASS INDEX: 26.58 KG/M2 | HEIGHT: 64 IN | DIASTOLIC BLOOD PRESSURE: 80 MMHG | HEART RATE: 92 BPM | SYSTOLIC BLOOD PRESSURE: 158 MMHG | RESPIRATION RATE: 14 BRPM | WEIGHT: 155.7 LBS | TEMPERATURE: 97.3 F | OXYGEN SATURATION: 96 %
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
## 2019-04-03 DIAGNOSIS — J01.01 ACUTE RECURRENT MAXILLARY SINUSITIS: Primary | ICD-10-CM

## 2019-04-03 DIAGNOSIS — H57.12 LEFT EYE PAIN: ICD-10-CM

## 2019-04-03 LAB — ERYTHROCYTE [SEDIMENTATION RATE] IN BLOOD BY WESTERGREN METHOD: 12 MM/H (ref 0–30)

## 2019-04-03 PROCEDURE — 36415 COLL VENOUS BLD VENIPUNCTURE: CPT | Performed by: FAMILY MEDICINE

## 2019-04-03 PROCEDURE — 99214 OFFICE O/P EST MOD 30 MIN: CPT | Performed by: FAMILY MEDICINE

## 2019-04-03 PROCEDURE — 86618 LYME DISEASE ANTIBODY: CPT | Performed by: FAMILY MEDICINE

## 2019-04-03 PROCEDURE — 85652 RBC SED RATE AUTOMATED: CPT | Performed by: FAMILY MEDICINE

## 2019-04-03 RX ORDER — AMOXICILLIN 500 MG/1
CAPSULE ORAL
Refills: 0 | COMMUNITY
Start: 2019-03-29 | End: 2021-09-28

## 2019-04-03 ASSESSMENT — MIFFLIN-ST. JEOR: SCORE: 1216.25

## 2019-04-03 ASSESSMENT — PAIN SCALES - GENERAL: PAINLEVEL: SEVERE PAIN (7)

## 2019-04-03 NOTE — PROGRESS NOTES
"  SUBJECTIVE:   Erinn Zamudio is a 69 year old female who presents to clinic today for the following health issues:    HPI  Acute Illness   Acute illness concerns: Sinus problem  Onset: 3-4 weeks    Fever: no    Chills/Sweats: no    Headache (location?): YES    Sinus Pressure:YES    Conjunctivitis:  no    Ear Pain: YES- little pain    Rhinorrhea: no     Congestion: no     Sore Throat: no      Cough: no    Wheeze: no    Decreased Appetite: no     Nausea: no     Vomiting: no     Diarrhea:  no     Dysuria/Freq.: no     Fatigue/Achiness: no     Sick/Strep Exposure: YES- daughter     Therapies Tried and outcome: amoxicillin from  visit on 3/29 and neti pot  The patient states that she had similar symptoms before. She states that she was sick a few times over the Winter months.   She states it feels like someone is \"pinching\" her eyeball. She states that the feeling can move from both eyes, to one eye. She notes that the her eyes feel painful.   She states that her nose and mouth is very dry. She reports having a headache and ear pain as well. The patient states that she has sinus pressure.   She states that she used a Neti Pot, and there was a few \"gunks\" of white coming out, but otherwise it was clear.   She is wondering if she can have an x-ray of her Sinus done. She notes that her Mother had a history of sinus problems.     Concern about Lyme disease   The patient is wondering if she can get a Lyme test. She states that she got a tick bite last year, but never got the test done. She states that she got a \"weird reaction\" from the tick bite. She notes that bite was really red, then had two long tails leading from the tick bite. One line went towards the her back, and the other went towards her neck. She notes she had neck pain, shoulder pain and headaches after the tick bite. She is wondering if these current issues could be related.     Problem list and histories reviewed & adjusted, as indicated.  Additional history: " "as documented    Patient Active Problem List   Diagnosis     CARDIOVASCULAR SCREENING; LDL GOAL LESS THAN 160     Advanced directives, counseling/discussion     S/P partial thyroidectomy     Past Surgical History:   Procedure Laterality Date     BREAST SURGERY       CARDIAC SURGERY       COLONOSCOPY  2013    Procedure: COLONOSCOPY;  Screening Colon   Brittnee Ayala;  Surgeon: Brittnee Ayala MD;  Location: [facility] OR     partial thyroidectomy       SURGICAL HISTORY OF -   1968    left breast, cystectomy       Social History     Tobacco Use     Smoking status: Never Smoker     Smokeless tobacco: Never Used   Substance Use Topics     Alcohol use: No     Family History   Problem Relation Age of Onset     Cerebrovascular Disease Mother          at 59 with brain anyerism     C.A.D. Father      Genitourinary Problems Daughter         kidney transplant     Asthma No family hx of      Diabetes No family hx of      Hypertension No family hx of      Breast Cancer No family hx of      Cancer - colorectal No family hx of      Prostate Cancer No family hx of          Current Outpatient Medications   Medication Sig Dispense Refill     amoxicillin (AMOXIL) 500 MG capsule TAKE 1 CAPSULE (500 MG) BY MOUTH THREE TIMES A DAY FOR 10 DAYS.  0     Cod Liver Oil 10 MINIM CAPS Take  by mouth.       Multiple Vitamins-Minerals (MULTIVITAMIN ADULT PO)        trimethoprim-polymyxin b (POLYTRIM) 97395-0.1 UNIT/ML-% ophthalmic solution INSTILL 1 DROP INTO BOTH EYES 4 TIMES A DAY FOR 7 DAYS  0     albuterol (PROAIR HFA/PROVENTIL HFA/VENTOLIN HFA) 108 (90 Base) MCG/ACT inhaler Inhale 2 puffs into the lungs every 6 hours (Patient not taking: Reported on 2019) 1 Inhaler 0     B Complex-C-Folic Acid (B COMPLEX + C TR PO) Take  by mouth.       spacer (OPTICHAMBER CHELSEA) holding chamber To be used with albuterol inhaler (Patient not taking: Reported on 2019) 1 each 0     Allergies   Allergen Reactions     Erythromycin      \"not " "feeling good\"     Recent Labs   Lab Test 07/11/18  0933 02/07/18 03/07/16  1527 11/01/11  1010   *  --   --   --  192*   HDL 50  --   --   --  52   TRIG 152*  --   --   --  106   ALT 35  --   --  41  --    CR 0.72  --   --  0.71  --    GFRESTIMATED 81  --   --  83  --    GFRESTBLACK >90  --   --  >90  African American GFR Calc    --    POTASSIUM 4.8  --   --  3.9  --    TSH 3.74 5.42*   < > 0.85 1.08    < > = values in this interval not displayed.      BP Readings from Last 3 Encounters:   04/03/19 158/80   02/07/19 150/88   01/08/19 134/80    Wt Readings from Last 3 Encounters:   04/03/19 70.6 kg (155 lb 11.2 oz)   02/07/19 69.4 kg (153 lb)   01/08/19 70.6 kg (155 lb 9.6 oz)        ROS:  CONSTITUTIONAL: NEGATIVE for fever, chills, change in weight  ENT/MOUTH: NEGATIVE for throat problems; POSITIVE for sinus pressure, ear pain, dry eyes, painful eyes, dry mouth and dry nose   RESP: NEGATIVE for significant cough or SOB  CV: NEGATIVE for chest pain, palpitations or peripheral edema  NEURO: NEGATIVE for weakness, dizziness or paresthesias; POSITIVE for headache     This document serves as a record of the services and decisions personally performed and made by Brittnee Ayala MD. It was created on her behalf by Deysi Caraballo, a trained medical scribe. The creation of this document is based the provider's statements to the medical scribe.    Deysi Caraballo April 3, 2019 12:08 PM  OBJECTIVE:     /80   Pulse 92   Temp 97.3  F (36.3  C) (Temporal)   Resp 14   Ht 1.626 m (5' 4\")   Wt 70.6 kg (155 lb 11.2 oz)   SpO2 96%   BMI 26.73 kg/m    Body mass index is 26.73 kg/m .  GENERAL: healthy, alert and no distress  HENT: ear canals and TM's normal, nose and mouth without ulcers or lesions, some mild erythema on nasal mucosa, no discharge or edema noted, no tenderness over the sinuses    NECK: no adenopathy, no asymmetry, masses, or scars and thyroid normal to palpation  RESP: lungs clear to auscultation " "- no rales, rhonchi or wheezes  CV: regular rate and rhythm, normal S1 S2, no S3 or S4, no murmur, click or rub, no peripheral edema and peripheral pulses strong    Diagnostic Test Results:  Results for orders placed or performed in visit on 04/03/19 (from the past 24 hour(s))   ESR: Erythrocyte sedimentation rate   Result Value Ref Range    Sed Rate 12 0 - 30 mm/h       ASSESSMENT/PLAN:   1. Acute recurrent maxillary sinusitis  Patient reports having sinus pressure, ear pain, dry eyes, painful eyes, dry mouth, dry nose and headaches.  She reports \"it feels like someone is pinching my eyeball\"- pain can move from one eye, to both eyes.   She reports described symptoms above started 4 weeks ago.   Patient reports having similar symptoms throughout Winter- still taking Amoxicillin from Urgent Care visit on March 29th, 2019.   She reports Mother had history of sinus problems.   Exam showed some mild erythema on nasal mucosa.  No discharge or edema noted.  No tenderness over the sinuses.  Discussed plan with patient.   Patient agreed.   CT order has been placed.   Patient will schedule appointment before leaving clinic.   Advised patient that she can continue use of Neti Pot- can use plain water if needed.   Symptoms patient experience could be due to Allergies. Evaluate for sinus lesions  Will evaluate CT scan and take appropriate next steps.   - CT Sinus w/o Contrast; Future    2. Tick bite, sequela  Patient reports having a tick bite last year- she expressed concern as to if she contracted Lyme Disease.   Explained it is unlikely  Discussed plan with patient.  Patient agreed.  Labs have been ordered.  Will notify with results.   - Lyme Disease Beth with reflex to WB Serum    3. Left eye pain  Patient reports having dry and painful eyes.   She reports \"it feels like someone is pinching my eyeball\"- pain can move from one eye, to both eyes.   She reports symptom above started 4 weeks ago.   Exam showed no abnormalities. "   Discussed plan with patient.  Patient agreed.   Labs have been ordered.  Awaiting results.   - ESR: Erythrocyte sedimentation rate    Follow up- Come back in 3 months for physical exam.     The information in this document, created by the medical scribe for me, accurately reflects the services I personally performed and the decisions made by me. I have reviewed and approved this document for accuracy prior to leaving the patient care area.    AAKASH TRUONG MD, MD  [facility]

## 2019-04-03 NOTE — TELEPHONE ENCOUNTER
Reason for Call:  Same Day Appointment, Requested Provider:  Brittnee Ayala MD    PCP: Brittnee Ayala    Reason for visit: f/u sinus infection    Duration of symptoms: ?    Have you been treated for this in the past? Yes    Additional comments: patient states she has been on several medications and nothing is helping. Would like to be seen today.    Can we leave a detailed message on this number? YES    Phone number patient can be reached at: Cell number on file:    Telephone Information:   Mobile [phone]       Best Time: anytime    Call taken on 4/3/2019 at 10:17 AM by Viry Car

## 2019-04-04 ENCOUNTER — ANCILLARY PROCEDURE (OUTPATIENT)
Dept: CT IMAGING | Facility: CLINIC | Age: 69
End: 2019-04-04
Attending: FAMILY MEDICINE
Payer: COMMERCIAL

## 2019-04-04 DIAGNOSIS — J01.01 ACUTE RECURRENT MAXILLARY SINUSITIS: ICD-10-CM

## 2019-04-04 LAB — B BURGDOR IGG+IGM SER QL: 0.03 (ref 0–0.89)

## 2019-04-04 PROCEDURE — 70486 CT MAXILLOFACIAL W/O DYE: CPT | Performed by: RADIOLOGY

## 2019-04-17 ENCOUNTER — TRANSFERRED RECORDS (OUTPATIENT)
Dept: HEALTH INFORMATION MANAGEMENT | Facility: CLINIC | Age: 69
End: 2019-04-17

## 2019-04-20 ENCOUNTER — TRANSFERRED RECORDS (OUTPATIENT)
Dept: HEALTH INFORMATION MANAGEMENT | Facility: CLINIC | Age: 69
End: 2019-04-20

## 2019-04-23 ENCOUNTER — TELEPHONE (OUTPATIENT)
Dept: FAMILY MEDICINE | Facility: CLINIC | Age: 69
End: 2019-04-23

## 2019-04-23 ENCOUNTER — MYC MEDICAL ADVICE (OUTPATIENT)
Dept: FAMILY MEDICINE | Facility: CLINIC | Age: 69
End: 2019-04-23

## 2019-04-23 ENCOUNTER — TRANSFERRED RECORDS (OUTPATIENT)
Dept: HEALTH INFORMATION MANAGEMENT | Facility: CLINIC | Age: 69
End: 2019-04-23

## 2019-04-23 DIAGNOSIS — J32.9 SINUSITIS, UNSPECIFIED CHRONICITY, UNSPECIFIED LOCATION: Primary | ICD-10-CM

## 2019-04-23 RX ORDER — AMOXICILLIN 875 MG
875 TABLET ORAL 2 TIMES DAILY
Qty: 20 TABLET | Refills: 0 | Status: SHIPPED | OUTPATIENT
Start: 2019-04-23 | End: 2019-05-03

## 2019-04-23 NOTE — TELEPHONE ENCOUNTER
Please check if she is referring to her MRI done at [facility] at recent admission.  I only have her discharge summary with the impression from the MRI.     It shows that she has no evidence of stroke. She has some plaque in the carotid arteries. This is not causing change in blood flow to the brain.  There are some mild microvascular (in the tiny vessels) changes. This is a normal finding at this age.  Good circulation in the brain.     Should speak to RN for hospital follow up.

## 2019-04-23 NOTE — TELEPHONE ENCOUNTER
Called patient back. Attempted to explain Vestibular neuritis and labyrinthitis is usually viral and is not treated with antibiotics. Patient is extremely determined to received antibiotics as she feels she has an ear infection.Tried to explain steroids are designed for inflammation and that dizziness can continue.    Patient would still like to speak to provider and received antibiotics.       Abdulaziz Buchanan RN, BSN

## 2019-04-23 NOTE — TELEPHONE ENCOUNTER
"Spoke with Erinn regarding recent hospitalization. Patient was diagnosed with labyrinthitis. She has been started on prednisone and meclizine. MRI without stroke. See below.     She is extremely anxious about her dizziness and concerned that she isn't much better. She is adamant that she has an ear infection and is adamant that she needs antibiotics. She is unwilling to come in. \"I'm unable to walk even with the walker\". She is sure that her sinuses or ear is infected \"from the Netti pot water. I don't think I boiled it long enough\".     Discussed labyrinthitis. Discussed it is a viral process and that antibiotics are unlikely to help. Discussed continuing with prednisone and consider adding antiviral. She declines the antiviral. She is adamant and tearful about antibiotics.     Did give a prescription. Let her know that time should help. Unlikely the antibiotics will. She is scheduled for follow up Friday if needed.   "

## 2019-04-23 NOTE — TELEPHONE ENCOUNTER
Reason for Call:  Other Patient would like an explanation on her MRI    Detailed comments: none    Phone Number Patient can be reached at: Cell number on file:    Telephone Information:   Mobile [phone]       Best Time: anytime    Can we leave a detailed message on this number? YES    Call taken on 4/23/2019 at 8:48 AM by James Perales

## 2019-04-23 NOTE — TELEPHONE ENCOUNTER
Hospitalist Reason for call:  Pt is calling back Abdulaziz and when she was in the ER they started out thinking she had vertigo but she didn't. She has destinbuler neuritis/ lavyrinthitis ) infection in her inner ear.  She states that she is  Recovering very slowly and there is not much progress.    Hospital sent her home with presidsone 20mg twice a day and  meclivine 25mg three times a day.    Her question is if this is an infection she wants to be on an antibiotic for the infection part because she doesn't want it to get to her eardrum and she doesn't want to lose her hearing. [facility] [place].

## 2019-04-26 ENCOUNTER — MYC MEDICAL ADVICE (OUTPATIENT)
Dept: FAMILY MEDICINE | Facility: CLINIC | Age: 69
End: 2019-04-26

## 2019-04-30 ENCOUNTER — OFFICE VISIT (OUTPATIENT)
Dept: AUDIOLOGY | Facility: CLINIC | Age: 69
End: 2019-04-30
Payer: COMMERCIAL

## 2019-04-30 ENCOUNTER — OFFICE VISIT (OUTPATIENT)
Dept: OTOLARYNGOLOGY | Facility: CLINIC | Age: 69
End: 2019-04-30
Payer: COMMERCIAL

## 2019-04-30 VITALS
RESPIRATION RATE: 18 BRPM | BODY MASS INDEX: 25.61 KG/M2 | HEIGHT: 64 IN | DIASTOLIC BLOOD PRESSURE: 103 MMHG | OXYGEN SATURATION: 97 % | WEIGHT: 150 LBS | TEMPERATURE: 97.8 F | HEART RATE: 95 BPM | SYSTOLIC BLOOD PRESSURE: 181 MMHG

## 2019-04-30 DIAGNOSIS — H90.3 SENSORINEURAL HEARING LOSS (SNHL) OF BOTH EARS: Primary | ICD-10-CM

## 2019-04-30 DIAGNOSIS — H81.21 VESTIBULAR NEURONITIS OF RIGHT EAR: ICD-10-CM

## 2019-04-30 DIAGNOSIS — H69.91 DYSFUNCTION OF RIGHT EUSTACHIAN TUBE: Primary | ICD-10-CM

## 2019-04-30 PROCEDURE — 92557 COMPREHENSIVE HEARING TEST: CPT | Performed by: AUDIOLOGIST

## 2019-04-30 PROCEDURE — 99203 OFFICE O/P NEW LOW 30 MIN: CPT | Performed by: OTOLARYNGOLOGY

## 2019-04-30 PROCEDURE — 92550 TYMPANOMETRY & REFLEX THRESH: CPT | Performed by: AUDIOLOGIST

## 2019-04-30 RX ORDER — FLUTICASONE PROPIONATE 50 MCG
1-2 SPRAY, SUSPENSION (ML) NASAL
Qty: 16 G | Refills: 3 | Status: SHIPPED | OUTPATIENT
Start: 2019-04-30 | End: 2021-09-28

## 2019-04-30 ASSESSMENT — ENCOUNTER SYMPTOMS
CONSTITUTIONAL NEGATIVE: 1
SPUTUM PRODUCTION: 0
SORE THROAT: 0
SENSORY CHANGE: 0
NAUSEA: 0
TREMORS: 0
TINGLING: 0
DIZZINESS: 1
VOMITING: 0
STRIDOR: 0
SPEECH CHANGE: 0
BRUISES/BLEEDS EASILY: 0
HEADACHES: 1
DOUBLE VISION: 0
HEARTBURN: 0
COUGH: 0
SINUS PAIN: 0
BLURRED VISION: 0
HEMOPTYSIS: 0

## 2019-04-30 ASSESSMENT — MIFFLIN-ST. JEOR: SCORE: 1190.4

## 2019-04-30 ASSESSMENT — PAIN SCALES - GENERAL: PAINLEVEL: NO PAIN (0)

## 2019-04-30 NOTE — PROGRESS NOTES
HPI    This is a 69 year old pleasant patient who has been having dizzy spells for the past two weeks. Started with a spinning sensation that lasted around a week. Associated with nausea, vomiting, aural fullness in her right ear. States that she did have an aura type symptom involving her vision and headache in her temples before the vertigo episode. She tries to avoid any body movement to prevent any dizzy spells. Spinning sensation is over but off-balance continues. Denies any recent cold, trauma, ear drainage, ear pain and tinnitus. No hx of weakness, numbness, tingling, vision changes, or trauma. 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 They did perform a Barbara Hallpike maneuver with no result. She was given two week steroid starting from 60 mg tapering into 5 mg. She will be done with steroid treatment tomorrow. She did try Diazepam and Meclizine with no benefit. No family hx of vertigo or Meniere's disease. Her MRI was normal.       Review of Systems   Constitutional: Negative.    HENT: Positive for congestion, hearing loss and tinnitus. Negative for ear discharge, ear pain, nosebleeds, sinus pain and sore throat.    Eyes: Negative for blurred vision and double vision.   Respiratory: Negative for cough, hemoptysis, sputum production and stridor.    Gastrointestinal: Negative for heartburn, nausea and vomiting.   Skin: Negative.    Neurological: Positive for dizziness and headaches. Negative for tingling, tremors, sensory change and speech change.   Endo/Heme/Allergies: Does not bruise/bleed easily.         Physical Exam   Constitutional: She appears well-developed and well-nourished.   HENT:   Head: Normocephalic and atraumatic.   Right Ear: Tympanic membrane, external ear and ear canal normal. No drainage, swelling or tenderness. No middle ear effusion. Decreased hearing is noted.   Left Ear: Tympanic membrane, external ear and ear canal normal. No drainage,  swelling or tenderness.  No middle ear effusion. Decreased hearing is noted.   Nose: Nose normal.   Mouth/Throat: Uvula is midline, oropharynx is clear and moist and mucous membranes are normal.   Eyes: Pupils are equal, round, and reactive to light. EOM are normal.   Neck: Normal range of motion. Neck supple.     No Spontaneous nystagmus      A/P    This pleasant patient is likely having vestibular neuronitis in her right ear. Good hydration, resting and completing her systemic steroid recommended. I will add a topical steroid nasal spray because she nasal congestion and ETD that my aggravate her symptoms. Other options discussed. She will be seen as needed and a caloric test will be ordered. We also will request MRI sections of her brain to review.

## 2019-04-30 NOTE — LETTER
4/30/2019         RE: Erinn Zamudio  [address]  [place] [address]        Dear Colleague,    Thank you for referring your patient, Erinn Zamudio, to the [facility]. Please see a copy of my visit note below.    HPI    This is a 69 year old pleasant patient who has been having dizzy spells for the past two weeks. Started with a spinning sensation that lasted around a week. Associated with nausea, vomiting, aural fullness in her right ear. States that she did have an aura type symptom involving her vision and headache in her temples before the vertigo episode. She tries to avoid any body movement to prevent any dizzy spells. Spinning sensation is over but off-balance continues. Denies any recent cold, trauma, ear drainage, ear pain and tinnitus. No hx of weakness, numbness, tingling, vision changes, or trauma. 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 They did perform a Barbara Hallpike maneuver with no result. She was given two week steroid starting from 60 mg tapering into 5 mg. She will be done with steroid treatment tomorrow. She did try Diazepam and Meclizine with no benefit. No family hx of vertigo or Meniere's disease. Her MRI was normal.       Review of Systems   Constitutional: Negative.    HENT: Positive for congestion, hearing loss and tinnitus. Negative for ear discharge, ear pain, nosebleeds, sinus pain and sore throat.    Eyes: Negative for blurred vision and double vision.   Respiratory: Negative for cough, hemoptysis, sputum production and stridor.    Gastrointestinal: Negative for heartburn, nausea and vomiting.   Skin: Negative.    Neurological: Positive for dizziness and headaches. Negative for tingling, tremors, sensory change and speech change.   Endo/Heme/Allergies: Does not bruise/bleed easily.         Physical Exam   Constitutional: She appears well-developed and well-nourished.   HENT:   Head: Normocephalic and atraumatic.    Right Ear: Tympanic membrane, external ear and ear canal normal. No drainage, swelling or tenderness. No middle ear effusion. Decreased hearing is noted.   Left Ear: Tympanic membrane, external ear and ear canal normal. No drainage, swelling or tenderness.  No middle ear effusion. Decreased hearing is noted.   Nose: Nose normal.   Mouth/Throat: Uvula is midline, oropharynx is clear and moist and mucous membranes are normal.   Eyes: Pupils are equal, round, and reactive to light. EOM are normal.   Neck: Normal range of motion. Neck supple.     No Spontaneous nystagmus      A/P    This pleasant patient is likely having vestibular neuronitis in her right ear. Good hydration, resting and completing her systemic steroid recommended. I will add a topical steroid nasal spray because she nasal congestion and ETD that my aggravate her symptoms. Other options discussed. She will be seen as needed and a caloric test will be ordered. We also will request MRI sections of her brain to review.    Again, thank you for allowing me to participate in the care of your patient.        Sincerely,        Romario Rodriguez MD

## 2019-04-30 NOTE — PROGRESS NOTES
AUDIOLOGY REPORT    SUMMARY: Audiology visit completed. See audiogram for results.    RECOMMENDATIONS: Follow-up with ENT.    Sherly Garrido  Doctor of Audiology  [place] License # [id number]

## 2019-04-30 NOTE — NURSING NOTE
"Erinn Zamudio's goals for this visit include:   Chief Complaint   Patient presents with     Consult     vertigo/ pressure in ears/head Audio 4/30       She requests these members of her care team be copied on today's visit information: Yes    PCP: Brittnee Ayala    Referring Provider:  No referring provider defined for this encounter.    BP (!) 181/103 (BP Location: Right arm, Patient Position: Sitting, Cuff Size: Adult Large)   Pulse 95   Temp 97.8  F (36.6  C) (Oral)   Resp 18   Ht 1.626 m (5' 4\")   Wt 68 kg (150 lb)   SpO2 97%   BMI 25.75 kg/m      Do you need any medication refills at today's visit? No    Brayden Collier CMA (AAMA)      "

## 2019-11-08 ENCOUNTER — TRANSFERRED RECORDS (OUTPATIENT)
Dept: HEALTH INFORMATION MANAGEMENT | Facility: CLINIC | Age: 69
End: 2019-11-08

## 2019-11-08 LAB
ALT SERPL-CCNC: 33 IU/L (ref 8–45)
AST SERPL-CCNC: 42 IU/L (ref 2–40)
CHOLEST SERPL-MCNC: 295 MG/DL (ref 100–199)
GLUCOSE SERPL-MCNC: 117 MG/DL (ref 65–100)
HBA1C MFR BLD: 6.2 % (ref 0–5.7)
HDLC SERPL-MCNC: 62 MG/DL
INR PPP: 1
LDLC SERPL CALC-MCNC: 218 MG/DL
NONHDLC SERPL-MCNC: 233 MG/DL
TRIGL SERPL-MCNC: 75 MG/DL
TSH SERPL-ACNC: 4.14 UIU/ML (ref 0.35–4.94)

## 2019-11-09 LAB
CREAT SERPL-MCNC: 0.8 MG/DL (ref 0.57–1.11)
GFR SERPL CREATININE-BSD FRML MDRD: >60 ML/MIN/1.73M2
POTASSIUM SERPL-SCNC: 4.5 MMOL/L (ref 3.5–5)

## 2019-11-11 ENCOUNTER — MEDICAL CORRESPONDENCE (OUTPATIENT)
Dept: HEALTH INFORMATION MANAGEMENT | Facility: CLINIC | Age: 69
End: 2019-11-11

## 2019-11-11 ENCOUNTER — TELEPHONE (OUTPATIENT)
Dept: FAMILY MEDICINE | Facility: CLINIC | Age: 69
End: 2019-11-11

## 2019-11-11 NOTE — TELEPHONE ENCOUNTER
Patient was in hospital due to heart attack and needs to see primary this week for follow up.     Please call to assist with work in.     Best number to reach cell: [phone]    Ok to leave message.

## 2019-11-11 NOTE — PROGRESS NOTES
"Manny Zamudio is a 69 year old female who presents to clinic today for the following health issues:    HPI     Hospital Follow-up Visit:    Hospital/Nursing Home/IP Rehab Facility: [facility]  Date of Admission: 11/8/2019  Date of Discharge: 11/9/2019  Reason(s) for Admission: Heart Attack            Problems taking medications regularly:  None       Medication changes since discharge: see medication list       Problems adhering to non-medication therapy:  None  Summary of hospitalization:  [facility] Discharge Summary Reviewed   Diagnostic Tests/Treatments reviewed.  Follow up needed: none  Other Healthcare Providers Involved in Patient s Care:         None  Update since discharge: improved.     Post Discharge Medication Reconciliation: discharge medications reconciled, continue medications without change.  Plan of care communicated with patient     Coding guidelines for this visit:  Type of Medical   Decision Making Face-to-Face Visit       within 7 Days of discharge Face-to-Face Visit        within 14 days of discharge   Moderate Complexity 21086 [id number]   High Complexity [id number]        The patient states that last Monday, she felt like she was getting heartburn around 10pm. She notes that she took TUMS and went to bed. She states that the whole night she felt indigestion and her stomach was \"growling\".   She notes that she went about her normal routine up until Thursday night. She states that Thursday night, she had the stomach issues and worsening heartburn. She notes Friday morning she had an EKG completed at  and everything was fine, however it was found that her Troponin Level was raised. She then had a stent placed.     Blood pressure   She notes that she is taking 6.25 MG Metoprolol.     Hyperlipidemia   The patient states that she does not eat well, as if she is in a hurry, she gets fast food. She notes that her cholesterol was significantly elevated while at the Hospital.    Hematoma " "of arm  The patient states that she had some arm pain due to the stent being placed. She states that it feels like \"a rubber band is around her arm\". She declines numbness of fingers.     Patient Active Problem List   Diagnosis     CARDIOVASCULAR SCREENING; LDL GOAL LESS THAN 160     Advanced directives, counseling/discussion     S/P partial thyroidectomy     Past Surgical History:   Procedure Laterality Date     BREAST SURGERY       CARDIAC SURGERY       COLONOSCOPY  2013    Procedure: COLONOSCOPY;  Screening Colon   Brittnee Ayala;  Surgeon: Brittnee Ayala MD;  Location: [facility] OR     partial thyroidectomy       SURGICAL HISTORY OF -   1968    left breast, cystectomy       Social History     Tobacco Use     Smoking status: Never Smoker     Smokeless tobacco: Never Used   Substance Use Topics     Alcohol use: No     Family History   Problem Relation Age of Onset     Cerebrovascular Disease Mother          at 59 with brain anyerism     C.A.D. Father      Genitourinary Problems Daughter         kidney transplant     Asthma No family hx of      Diabetes No family hx of      Hypertension No family hx of      Breast Cancer No family hx of      Cancer - colorectal No family hx of      Prostate Cancer No family hx of          Current Outpatient Medications   Medication Sig Dispense Refill     aspirin (ASA) 81 MG chewable tablet Take 81 mg by mouth       atorvastatin (LIPITOR) 80 MG tablet Take 80 mg by mouth       clopidogrel (PLAVIX) 75 MG tablet TAKE 1 TABLET BY MOUTH EVERY MORNING. TAKE FOR 1 YEAR MINIMUM  3     metoprolol tartrate (LOPRESSOR) 25 MG tablet Take 6.25 mg by mouth       albuterol (PROAIR HFA/PROVENTIL HFA/VENTOLIN HFA) 108 (90 Base) MCG/ACT inhaler Inhale 2 puffs into the lungs every 6 hours (Patient not taking: Reported on 2019) 1 Inhaler 0     amoxicillin (AMOXIL) 500 MG capsule TAKE 1 CAPSULE (500 MG) BY MOUTH THREE TIMES A DAY FOR 10 DAYS.  0     B Complex-C-Folic Acid (B COMPLEX + " "C TR PO) Take  by mouth.       Cod Liver Oil 10 MINIM CAPS Take  by mouth.       fluticasone (FLONASE) 50 MCG/ACT nasal spray Spray 1-2 sprays into both nostrils 2 times daily (Patient not taking: Reported on 11/13/2019) 16 g 3     Multiple Vitamins-Minerals (MULTIVITAMIN ADULT PO)        spacer (OPTICHAMBER CHELSEA) holding chamber To be used with albuterol inhaler (Patient not taking: Reported on 2/19/2019) 1 each 0     trimethoprim-polymyxin b (POLYTRIM) 17439-1.1 UNIT/ML-% ophthalmic solution INSTILL 1 DROP INTO BOTH EYES 4 TIMES A DAY FOR 7 DAYS  0     Allergies   Allergen Reactions     Erythromycin      \"not feeling good\"     Tetracyclines      Don't feel well     Recent Labs   Lab Test 07/11/18  0933 02/07/18 03/07/16  1527 11/01/11  1010   *  --   --   --  192*   HDL 50  --   --   --  52   TRIG 152*  --   --   --  106   ALT 35  --   --  41  --    CR 0.72  --   --  0.71  --    GFRESTIMATED 81  --   --  83  --    GFRESTBLACK >90  --   --  >90  African American GFR Calc    --    POTASSIUM 4.8  --   --  3.9  --    TSH 3.74 5.42*   < > 0.85 1.08    < > = values in this interval not displayed.      BP Readings from Last 3 Encounters:   11/13/19 132/72   04/30/19 (!) 181/103   04/03/19 158/80    Wt Readings from Last 3 Encounters:   11/13/19 69.9 kg (154 lb 3.2 oz)   04/30/19 68 kg (150 lb)   04/03/19 70.6 kg (155 lb 11.2 oz)      Reviewed and updated as needed this visit by Provider         Review of Systems   ROS COMP: CONSTITUTIONAL: NEGATIVE for fever, chills, change in weight  INTEGUMENTARY/SKIN: NEGATIVE for worrisome rashes, moles or lesions; POSITIVE for hematoma and bruises   ENT/MOUTH: NEGATIVE for ear, mouth and throat problems  RESP: NEGATIVE for significant cough or SOB  CV: NEGATIVE for chest pain, palpitations or peripheral edema    This document serves as a record of the services and decisions personally performed and made by Brittnee Ayala MD. It was created on her behalf by Deysi" "Ilya, a trained medical scribe. The creation of this document is based the provider's statements to the medical scribe.    Deysi Ilya November 13, 2019 4:30 PM        Objective    /72   Pulse 88   Temp 97.4  F (36.3  C) (Temporal)   Resp 18   Ht 1.638 m (5' 4.5\")   Wt 69.9 kg (154 lb 3.2 oz)   SpO2 96%   BMI 26.06 kg/m    Body mass index is 26.06 kg/m .  Physical Exam   GENERAL: healthy, alert and no distress  NECK: no adenopathy, no asymmetry, masses, or scars and thyroid normal to palpation  RESP: lungs clear to auscultation - no rales, rhonchi or wheezes  CV: regular rate and rhythm, normal S1 S2, no S3 or S4, no murmur, click or rub, no peripheral edema and peripheral pulses strong  MS: no gross musculoskeletal defects noted, no edema  SKIN: no suspicious lesions or rashes, Right forearm bruising on palmar aspect and a small dime sized hematoma that is tender, no warmth noted     Diagnostic Test Results:  No results found for this or any previous visit (from the past 24 hour(s)).        Assessment & Plan     1. Hospital discharge  NSTEMI (non-ST elevated myocardial infarction) (H)  Patient reports last Monday she had heartburn and a \"growling stomach\" around 10pm- continued with normal routine as symptoms subsided, until Thursday night when symptoms recurred.   Patient then went to  and had tests completed- found that Troponin level was raised. Patient then went via ambulance to Hospital. Had stent placed.   She declines symptoms noted above or chest pain currently.   Consider adding another blood pressure medication in the future per Cardiologist recommendation.   Doing well. Well controlled. Tolerating medication.  No change in plan.     2. Hyperlipidemia LDL goal <70  Reviewed labs from patient's hospital visit- noted that patient's Cholesterol was elevated.   Doing well. Well controlled. Tolerating medication.  No change in plan.     3. Hematoma of skin  Patient reports having arm " "pain due to stent being placed.   She reports \"it feels like a rubber band is around her arm\".   She declines numbness of fingers.   See exam findings noted above.   Advised patient that this is normal and bruising should heal before hematoma does.   If she notices numbness or cold sensation in fingers, she will contact me.     4. S/P partial thyroidectomy  Patient is doing well. No concerns today.   Stable. Will continue to monitor.      BMI:   Estimated body mass index is 26.06 kg/m  as calculated from the following:    Height as of this encounter: 1.638 m (5' 4.5\").    Weight as of this encounter: 69.9 kg (154 lb 3.2 oz).   Weight management plan: Discussed healthy diet and exercise guidelines    Follow up- Come back in 2 months for physical exam and lab work. Planned follow up with cardiology.    The information in this document, created by the medical scribe for me, accurately reflects the services I personally performed and the decisions made by me. I have reviewed and approved this document for accuracy prior to leaving the patient care area.    Brittnee Ayala MD  [facility]      "

## 2019-11-12 ENCOUNTER — TELEPHONE (OUTPATIENT)
Dept: FAMILY MEDICINE | Facility: CLINIC | Age: 69
End: 2019-11-12

## 2019-11-12 NOTE — TELEPHONE ENCOUNTER
Reason for call:  Form  Reason for Call:  Form, our goal is to have forms completed with 72 hours, however, some forms may require a visit or additional information.    Type of letter, form or note:  medical    Who is the form from?:  [facility]    Where did the form come from: form was faxed in    What clinic location was the form placed at?: [facility] - [phone]    Where the form was placed: [facility]    What number is listed as a contact on the form?:  [phone]       Additional comments:  Fax to [phone]    created by Bernadine Rea

## 2019-11-13 ENCOUNTER — OFFICE VISIT (OUTPATIENT)
Dept: FAMILY MEDICINE | Facility: CLINIC | Age: 69
End: 2019-11-13
Payer: COMMERCIAL

## 2019-11-13 VITALS
HEIGHT: 65 IN | SYSTOLIC BLOOD PRESSURE: 132 MMHG | BODY MASS INDEX: 25.69 KG/M2 | DIASTOLIC BLOOD PRESSURE: 72 MMHG | RESPIRATION RATE: 18 BRPM | WEIGHT: 154.2 LBS | TEMPERATURE: 97.4 F | HEART RATE: 88 BPM | OXYGEN SATURATION: 96 %

## 2019-11-13 DIAGNOSIS — Z79.899 ENCOUNTER FOR LONG-TERM (CURRENT) USE OF MEDICATIONS: ICD-10-CM

## 2019-11-13 DIAGNOSIS — I21.4 NSTEMI (NON-ST ELEVATED MYOCARDIAL INFARCTION) (H): ICD-10-CM

## 2019-11-13 DIAGNOSIS — E78.5 HYPERLIPIDEMIA LDL GOAL <70: ICD-10-CM

## 2019-11-13 DIAGNOSIS — E89.0 S/P PARTIAL THYROIDECTOMY: ICD-10-CM

## 2019-11-13 DIAGNOSIS — T14.8XXA HEMATOMA OF SKIN: ICD-10-CM

## 2019-11-13 DIAGNOSIS — Z09 HOSPITAL DISCHARGE FOLLOW-UP: Primary | ICD-10-CM

## 2019-11-13 PROCEDURE — 36415 COLL VENOUS BLD VENIPUNCTURE: CPT | Performed by: FAMILY MEDICINE

## 2019-11-13 PROCEDURE — 99214 OFFICE O/P EST MOD 30 MIN: CPT | Performed by: FAMILY MEDICINE

## 2019-11-13 PROCEDURE — 80048 BASIC METABOLIC PNL TOTAL CA: CPT | Performed by: FAMILY MEDICINE

## 2019-11-13 RX ORDER — CLOPIDOGREL BISULFATE 75 MG/1
TABLET ORAL
Refills: 3 | COMMUNITY
Start: 2019-11-08 | End: 2021-09-28

## 2019-11-13 RX ORDER — METOPROLOL TARTRATE 25 MG/1
6.25 TABLET, FILM COATED ORAL
COMMUNITY
Start: 2019-11-10 | End: 2021-09-28

## 2019-11-13 RX ORDER — ASPIRIN 81 MG/1
81 TABLET, CHEWABLE ORAL
COMMUNITY
Start: 2019-11-10

## 2019-11-13 RX ORDER — ATORVASTATIN CALCIUM 80 MG/1
80 TABLET, FILM COATED ORAL
COMMUNITY
Start: 2019-11-08 | End: 2021-09-28 | Stop reason: DRUGHIGH

## 2019-11-13 ASSESSMENT — PAIN SCALES - GENERAL: PAINLEVEL: MILD PAIN (3)

## 2019-11-13 ASSESSMENT — MIFFLIN-ST. JEOR: SCORE: 1217.39

## 2019-11-13 NOTE — PATIENT INSTRUCTIONS
You can apply heat or ice to the area of pain on your right arm. If your fingers feel numb or cold, contact me.

## 2019-11-14 LAB
ANION GAP SERPL CALCULATED.3IONS-SCNC: 5 MMOL/L (ref 3–14)
BUN SERPL-MCNC: 21 MG/DL (ref 7–30)
CALCIUM SERPL-MCNC: 9.1 MG/DL (ref 8.5–10.1)
CHLORIDE SERPL-SCNC: 107 MMOL/L (ref 94–109)
CO2 SERPL-SCNC: 27 MMOL/L (ref 20–32)
CREAT SERPL-MCNC: 0.7 MG/DL (ref 0.52–1.04)
GFR SERPL CREATININE-BSD FRML MDRD: 87 ML/MIN/{1.73_M2}
GLUCOSE SERPL-MCNC: 102 MG/DL (ref 70–99)
POTASSIUM SERPL-SCNC: 4.6 MMOL/L (ref 3.4–5.3)
SODIUM SERPL-SCNC: 139 MMOL/L (ref 133–144)

## 2019-11-28 ENCOUNTER — TRANSFERRED RECORDS (OUTPATIENT)
Dept: HEALTH INFORMATION MANAGEMENT | Facility: CLINIC | Age: 69
End: 2019-11-28

## 2019-11-28 LAB
CREAT SERPL-MCNC: 0.76 MG/DL (ref 0.57–1.11)
EJECTION FRACTION: 70 %
GFR SERPL CREATININE-BSD FRML MDRD: >60 ML/MIN/1.73M2
GLUCOSE SERPL-MCNC: 101 MG/DL (ref 65–100)
POTASSIUM SERPL-SCNC: 3.8 MMOL/L (ref 3.5–5)

## 2019-11-29 ENCOUNTER — PATIENT OUTREACH (OUTPATIENT)
Dept: CARE COORDINATION | Facility: CLINIC | Age: 69
End: 2019-11-29

## 2019-11-29 DIAGNOSIS — I24.9 ACS (ACUTE CORONARY SYNDROME) (H): Primary | ICD-10-CM

## 2019-11-29 NOTE — PROGRESS NOTES
Clinic Care Coordination Contact    Clinic Care Coordination Contact  OUTREACH    Referral Information:  Referral Source: [facility]    Primary Diagnosis: Cardiovascular - other    Chief Complaint   Patient presents with     Clinic Care Coordination - Post Hospital     RN        Universal Utilization: Patient utilizes [facility] and was inpatient 11/8/19-11/19/19, 11/28/19 and had an ED visit 11/10/19.  Clinic Utilization  Difficulty keeping appointments:: No  Compliance Concerns: No  No-Show Concerns: No  No PCP office visit in Past Year: No  Utilization    Last refreshed: 11/29/2019  9:06 AM:  Hospital Admissions 0           Last refreshed: 11/29/2019  9:06 AM:  ED Visits 0           Last refreshed: 11/29/2019  9:06 AM:  No Show Count (past year) 0              Current as of: 11/29/2019  9:06 AM              Clinical Concerns:  Current Medical Concerns:  Patient was at [facility] 11/28/19 due to Acute Coronary Syndrome and underwent an angiogram.  Patient had a recent NSTEMI 11/8/19.  Patient denies any further chest discomfort pain or additional symptoms of shortness of breath, dizziness.  Patient has a future appointment at the [facility] 12/2/19 and states she wishes to pursue coordination of her care through them at this time.  Patient states she will contact writer if she wishes to have Primary Care-Care Coordination coordination.      Current Behavioral Concerns: n/a      Education Provided to patient: RN CC educated patient on care coordination services and reviewed hospital discharge instructions.     Pain  Pain (GOAL):: No  Health Maintenance Reviewed: Due/Overdue   Health Maintenance Due   Topic Date Due     DTAP/TDAP/TD IMMUNIZATION (1 - Tdap) 01/27/1961     ZOSTER IMMUNIZATION (1 of 2) 01/27/2000     PNEUMOCOCCAL IMMUNIZATION 65+ LOW/MEDIUM RISK (1 of 2 - PCV13) 01/27/2015     ADVANCE CARE PLANNING  12/16/2018     MAMMO SCREENING  05/26/2019     MEDICARE ANNUAL WELLNESS  VISIT  07/11/2019     FALL RISK ASSESSMENT  07/11/2019     INFLUENZA VACCINE (1) 09/01/2019      Clinical Pathway: None    Medication Management:  RN CC reviewed new medication with patient and updated medication list.      Functional Status:       Living Situation:  Current living arrangement:: I live in a private home with spouse    Diet/Exercise/Sleep:       Transportation:           Psychosocial:  Islam or spiritual beliefs that impact treatment:: No  Mental health DX:: No  Mental health management concern (GOAL):: No     Financial/Insurance:           Resources and Interventions:  Current Resources:    ;              Advance Care Plan/Directive  Advanced Care Plans/Directives on file:: No          Goals: n/a        Patient/Caregiver understanding: Patient verbalized understanding of hospital discharge instructions and care coordination services.  Patient declines care coordination at this time stating she will participate in [facility] program on 12/2/19, but will outreach to writer if needs develop.           Plan:   1. Patient will continue to follow treatment plan as directed and follow up with PCP and specialists with future concerns.   2. RN CC will do no further outreaches at this time.    Melissa Behl BSN, RN, PHN, CCM  Primary Care Clinical RN Care Coordinator  [facility]   [phone]

## 2019-12-02 ENCOUNTER — TRANSFERRED RECORDS (OUTPATIENT)
Dept: HEALTH INFORMATION MANAGEMENT | Facility: CLINIC | Age: 69
End: 2019-12-02

## 2019-12-02 LAB
ALT SERPL-CCNC: 22 U/L (ref 7–45)
AST SERPL-CCNC: 23 U/L (ref 8–43)
CHOLEST SERPL-MCNC: 117 MG/DL
CREAT SERPL-MCNC: 0.76 MG/DL (ref 0.59–1.04)
GFR SERPL CREATININE-BSD FRML MDRD: 80 ML/MIN/BSA
GLUCOSE SERPL-MCNC: 94 MG/DL (ref 70–100)
HBA1C MFR BLD: 5.8 % (ref 4–5.6)
HDLC SERPL-MCNC: 51 MG/DL
INR PPP: 1.1 (ref 0.9–1.1)
LDLC SERPL CALC-MCNC: 47 MG/DL
NONHDLC SERPL-MCNC: 66 MG/DL
POTASSIUM SERPL-SCNC: 4.6 MMOL/L (ref 3.6–5.2)
TRIGL SERPL-MCNC: 96 MG/DL

## 2019-12-04 ENCOUNTER — TRANSFERRED RECORDS (OUTPATIENT)
Dept: HEALTH INFORMATION MANAGEMENT | Facility: CLINIC | Age: 69
End: 2019-12-04

## 2019-12-13 ENCOUNTER — TRANSFERRED RECORDS (OUTPATIENT)
Dept: HEALTH INFORMATION MANAGEMENT | Facility: CLINIC | Age: 69
End: 2019-12-13

## 2019-12-31 ENCOUNTER — TRANSFERRED RECORDS (OUTPATIENT)
Dept: HEALTH INFORMATION MANAGEMENT | Facility: CLINIC | Age: 69
End: 2019-12-31

## 2020-02-23 ENCOUNTER — HEALTH MAINTENANCE LETTER (OUTPATIENT)
Age: 70
End: 2020-02-23

## 2020-09-29 ENCOUNTER — MEDICAL CORRESPONDENCE (OUTPATIENT)
Dept: HEALTH INFORMATION MANAGEMENT | Facility: CLINIC | Age: 70
End: 2020-09-29

## 2020-10-06 ENCOUNTER — TRANSFERRED RECORDS (OUTPATIENT)
Dept: HEALTH INFORMATION MANAGEMENT | Facility: CLINIC | Age: 70
End: 2020-10-06

## 2020-10-09 ENCOUNTER — TRANSFERRED RECORDS (OUTPATIENT)
Dept: HEALTH INFORMATION MANAGEMENT | Facility: CLINIC | Age: 70
End: 2020-10-09

## 2020-10-14 ENCOUNTER — TRANSFERRED RECORDS (OUTPATIENT)
Dept: HEALTH INFORMATION MANAGEMENT | Facility: CLINIC | Age: 70
End: 2020-10-14

## 2020-10-16 ENCOUNTER — TRANSFERRED RECORDS (OUTPATIENT)
Dept: HEALTH INFORMATION MANAGEMENT | Facility: CLINIC | Age: 70
End: 2020-10-16

## 2020-10-16 LAB
ALT SERPL-CCNC: 38 U/L (ref 0–78)
AST SERPL-CCNC: 32 U/L (ref 0–37)

## 2020-11-18 DIAGNOSIS — I25.10 CORONARY ATHEROSCLEROSIS OF NATIVE CORONARY ARTERY: ICD-10-CM

## 2020-11-18 DIAGNOSIS — R07.9 CHEST PAIN, UNSPECIFIED: ICD-10-CM

## 2020-11-18 DIAGNOSIS — I10 ESSENTIAL HYPERTENSION, MALIGNANT: ICD-10-CM

## 2020-11-18 DIAGNOSIS — K82.4 GALL BLADDER POLYP: ICD-10-CM

## 2020-11-18 DIAGNOSIS — I21.4 ACUTE MYOCARDIAL INFARCTION, SUBENDOCARDIAL INFARCTION, INITIAL EPISODE OF CARE (H): ICD-10-CM

## 2020-11-18 LAB
ALBUMIN SERPL-MCNC: 3.7 G/DL (ref 3.4–5)
ALP SERPL-CCNC: 165 U/L (ref 40–150)
ALT SERPL W P-5'-P-CCNC: 54 U/L (ref 0–50)
AST SERPL W P-5'-P-CCNC: 44 U/L (ref 0–45)
BILIRUB DIRECT SERPL-MCNC: 0.1 MG/DL (ref 0–0.2)
BILIRUB SERPL-MCNC: 0.7 MG/DL (ref 0.2–1.3)
CHOLEST SERPL-MCNC: 151 MG/DL
GGT SERPL-CCNC: 176 U/L (ref 0–40)
GLUCOSE SERPL-MCNC: 92 MG/DL (ref 70–99)
HDLC SERPL-MCNC: 58 MG/DL
LDLC SERPL CALC-MCNC: 73 MG/DL
NONHDLC SERPL-MCNC: 93 MG/DL
PROT SERPL-MCNC: 7.6 G/DL (ref 6.8–8.8)
TRIGL SERPL-MCNC: 99 MG/DL

## 2020-11-18 PROCEDURE — 80061 LIPID PANEL: CPT | Performed by: INTERNAL MEDICINE

## 2020-11-18 PROCEDURE — 82947 ASSAY GLUCOSE BLOOD QUANT: CPT | Performed by: INTERNAL MEDICINE

## 2020-11-18 PROCEDURE — 80076 HEPATIC FUNCTION PANEL: CPT | Performed by: INTERNAL MEDICINE

## 2020-11-18 PROCEDURE — 82977 ASSAY OF GGT: CPT | Performed by: INTERNAL MEDICINE

## 2020-11-18 PROCEDURE — 36415 COLL VENOUS BLD VENIPUNCTURE: CPT | Performed by: INTERNAL MEDICINE

## 2020-12-06 ENCOUNTER — HEALTH MAINTENANCE LETTER (OUTPATIENT)
Age: 70
End: 2020-12-06

## 2020-12-15 ENCOUNTER — MYC MEDICAL ADVICE (OUTPATIENT)
Dept: FAMILY MEDICINE | Facility: CLINIC | Age: 70
End: 2020-12-15

## 2020-12-15 DIAGNOSIS — R74.8 ELEVATED SERUM ALKALINE PHOSPHATASE LEVEL: Primary | ICD-10-CM

## 2020-12-21 ENCOUNTER — MYC MEDICAL ADVICE (OUTPATIENT)
Dept: FAMILY MEDICINE | Facility: CLINIC | Age: 70
End: 2020-12-21

## 2020-12-21 DIAGNOSIS — R74.8 ELEVATED SERUM ALKALINE PHOSPHATASE LEVEL: Primary | ICD-10-CM

## 2020-12-22 NOTE — TELEPHONE ENCOUNTER
Patient has an appointment scheduled 12/28/20. Please place requested orders by patient if approved.     Evaristo Mart RN  December 22, 2020

## 2020-12-28 DIAGNOSIS — R74.8 ELEVATED SERUM ALKALINE PHOSPHATASE LEVEL: ICD-10-CM

## 2020-12-28 LAB
ALBUMIN SERPL-MCNC: 3.6 G/DL (ref 3.4–5)
ALP SERPL-CCNC: 144 U/L (ref 40–150)
ALT SERPL W P-5'-P-CCNC: 64 U/L (ref 0–50)
AST SERPL W P-5'-P-CCNC: 49 U/L (ref 0–45)
BILIRUB DIRECT SERPL-MCNC: 0.1 MG/DL (ref 0–0.2)
BILIRUB SERPL-MCNC: 0.5 MG/DL (ref 0.2–1.3)
GGT SERPL-CCNC: 168 U/L (ref 0–40)
PROT SERPL-MCNC: 7 G/DL (ref 6.8–8.8)

## 2020-12-28 PROCEDURE — 83516 IMMUNOASSAY NONANTIBODY: CPT | Performed by: FAMILY MEDICINE

## 2020-12-28 PROCEDURE — 86256 FLUORESCENT ANTIBODY TITER: CPT | Mod: 90 | Performed by: FAMILY MEDICINE

## 2020-12-28 PROCEDURE — 80076 HEPATIC FUNCTION PANEL: CPT | Performed by: FAMILY MEDICINE

## 2020-12-28 PROCEDURE — 82784 ASSAY IGA/IGD/IGG/IGM EACH: CPT | Performed by: FAMILY MEDICINE

## 2020-12-28 PROCEDURE — 86038 ANTINUCLEAR ANTIBODIES: CPT | Performed by: FAMILY MEDICINE

## 2020-12-28 PROCEDURE — 86039 ANTINUCLEAR ANTIBODIES (ANA): CPT | Performed by: FAMILY MEDICINE

## 2020-12-28 PROCEDURE — 82977 ASSAY OF GGT: CPT | Performed by: FAMILY MEDICINE

## 2020-12-28 PROCEDURE — 36415 COLL VENOUS BLD VENIPUNCTURE: CPT | Performed by: FAMILY MEDICINE

## 2020-12-28 PROCEDURE — 99000 SPECIMEN HANDLING OFFICE-LAB: CPT | Performed by: FAMILY MEDICINE

## 2020-12-29 LAB
ANA PAT SER IF-IMP: ABNORMAL
ANA SER QL IF: ABNORMAL
ANA TITR SER IF: ABNORMAL {TITER}
ENDOMYSIUM IGA TITR SER IF: NORMAL {TITER}
IGA SERPL-MCNC: 222 MG/DL (ref 84–499)

## 2020-12-30 LAB
MITOCHONDRIA M2 IGG SER-ACNC: 1 U/ML
TTG IGA SER-ACNC: 1 U/ML
TTG IGG SER-ACNC: <1 U/ML

## 2021-03-03 ENCOUNTER — IMMUNIZATION (OUTPATIENT)
Dept: PEDIATRICS | Facility: CLINIC | Age: 71
End: 2021-03-03
Payer: COMMERCIAL

## 2021-03-03 PROCEDURE — 91300 PR COVID VAC PFIZER DIL RECON 30 MCG/0.3 ML IM: CPT

## 2021-03-03 PROCEDURE — 0001A PR COVID VAC PFIZER DIL RECON 30 MCG/0.3 ML IM: CPT

## 2021-03-24 ENCOUNTER — IMMUNIZATION (OUTPATIENT)
Dept: PEDIATRICS | Facility: CLINIC | Age: 71
End: 2021-03-24
Attending: INTERNAL MEDICINE
Payer: COMMERCIAL

## 2021-03-24 PROCEDURE — 91300 PR COVID VAC PFIZER DIL RECON 30 MCG/0.3 ML IM: CPT

## 2021-03-24 PROCEDURE — 0002A PR COVID VAC PFIZER DIL RECON 30 MCG/0.3 ML IM: CPT

## 2021-04-11 ENCOUNTER — HEALTH MAINTENANCE LETTER (OUTPATIENT)
Age: 71
End: 2021-04-11

## 2021-05-19 ENCOUNTER — MYC MEDICAL ADVICE (OUTPATIENT)
Dept: FAMILY MEDICINE | Facility: CLINIC | Age: 71
End: 2021-05-19

## 2021-05-19 DIAGNOSIS — Z13.1 SCREENING FOR DIABETES MELLITUS: ICD-10-CM

## 2021-05-19 DIAGNOSIS — E78.5 HYPERLIPIDEMIA LDL GOAL <70: Primary | ICD-10-CM

## 2021-05-26 ENCOUNTER — MYC MEDICAL ADVICE (OUTPATIENT)
Dept: FAMILY MEDICINE | Facility: CLINIC | Age: 71
End: 2021-05-26

## 2021-05-26 DIAGNOSIS — Z13.1 SCREENING FOR DIABETES MELLITUS: ICD-10-CM

## 2021-05-26 DIAGNOSIS — E78.5 HYPERLIPIDEMIA LDL GOAL <70: ICD-10-CM

## 2021-05-26 DIAGNOSIS — R74.8 ACID PHOSPHATASE ELEVATED: Primary | ICD-10-CM

## 2021-05-26 LAB
CHOLEST SERPL-MCNC: 141 MG/DL
GLUCOSE SERPL-MCNC: 89 MG/DL (ref 70–99)
HDLC SERPL-MCNC: 59 MG/DL
LDLC SERPL CALC-MCNC: 64 MG/DL
NONHDLC SERPL-MCNC: 82 MG/DL
TRIGL SERPL-MCNC: 89 MG/DL

## 2021-05-26 PROCEDURE — 36415 COLL VENOUS BLD VENIPUNCTURE: CPT | Performed by: FAMILY MEDICINE

## 2021-05-26 PROCEDURE — 80076 HEPATIC FUNCTION PANEL: CPT | Performed by: STUDENT IN AN ORGANIZED HEALTH CARE EDUCATION/TRAINING PROGRAM

## 2021-05-26 PROCEDURE — 80061 LIPID PANEL: CPT | Performed by: FAMILY MEDICINE

## 2021-05-26 PROCEDURE — 82947 ASSAY GLUCOSE BLOOD QUANT: CPT | Performed by: FAMILY MEDICINE

## 2021-05-27 LAB
ALBUMIN SERPL-MCNC: 3.7 G/DL (ref 3.4–5)
ALP SERPL-CCNC: 129 U/L (ref 40–150)
ALP SERPL-CCNC: NORMAL U/L (ref 40–150)
ALT SERPL W P-5'-P-CCNC: 48 U/L (ref 0–50)
AST SERPL W P-5'-P-CCNC: 34 U/L (ref 0–45)
BILIRUB DIRECT SERPL-MCNC: 0.1 MG/DL (ref 0–0.2)
BILIRUB SERPL-MCNC: 0.6 MG/DL (ref 0.2–1.3)
PROT SERPL-MCNC: 7.1 G/DL (ref 6.8–8.8)

## 2021-05-27 NOTE — TELEPHONE ENCOUNTER
Patient calling on status of whether or not orders had been received from [place] on lab to add to draw that was completed today? Informed of labs that have been completed and patient stating it was the Hepatic that had not been done, and informed patient that a Hepatic panel was added and completed today. Amy Mejias LPN

## 2021-05-27 NOTE — TELEPHONE ENCOUNTER
Patient called and wants someone to call her and let her know when the orders from the [place] come so they can add it to the lab work she had done today.

## 2021-09-25 ENCOUNTER — HEALTH MAINTENANCE LETTER (OUTPATIENT)
Age: 71
End: 2021-09-25

## 2021-09-25 ASSESSMENT — ACTIVITIES OF DAILY LIVING (ADL): CURRENT_FUNCTION: NO ASSISTANCE NEEDED

## 2021-09-27 ASSESSMENT — ACTIVITIES OF DAILY LIVING (ADL): CURRENT_FUNCTION: NO ASSISTANCE NEEDED

## 2021-09-27 NOTE — PROGRESS NOTES
"SUBJECTIVE:   Erinn Zamudio is a 71 year old female who presents for Preventive Visit.      Patient has been advised of split billing requirements and indicates understanding: Yes   Are you in the first 12 months of your Medicare coverage?  No    Healthy Habits:     In general, how would you rate your overall health?  Good    Frequency of exercise:  2-3 days/week    Duration of exercise:  15-30 minutes    Do you usually eat at least 4 servings of fruit and vegetables a day, include whole grains    & fiber and avoid regularly eating high fat or \"junk\" foods?  No    Ability to successfully perform activities of daily living:  No assistance needed    Home Safety:  No safety concerns identified    Hearing Impairment:  Need to ask people to speak up or repeat themselves and difficulty understanding soft or whispered speech    In the past 6 months, have you been bothered by leaking of urine?  No    In general, how would you rate your overall mental or emotional health?  Good      PHQ-2 Total Score: 0    Additional concerns today:  Yes    Do you feel safe in your environment? Yes    Have you ever done Advance Care Planning? (For example, a Health Directive, POLST, or a discussion with a medical provider or your loved ones about your wishes): No, advance care planning information given to patient to review.  Patient plans to discuss their wishes with loved ones or provider.         Fall risk  Fallen 2 or more times in the past year?: No  Any fall with injury in the past year?: No    Cognitive Screening   1) Repeat 3 items (Leader, Season, Table)    2) Clock draw: NORMAL  3) 3 item recall: Recalls 3 objects  Results: 3 items recalled: COGNITIVE IMPAIRMENT LESS LIKELY    Mini-CogTM Copyright KERRY Pacheco. Licensed by the author for use in [facility]; reprinted with permission ([email or web address].[facility]). All rights reserved.      Do you have sleep apnea, excessive snoring or daytime drowsiness?: no    Reviewed and updated as needed " this visit by clinical staff  Tobacco  Allergies  Meds  Problems  Med Hx  Surg Hx  Fam Hx  Soc Hx          Reviewed and updated as needed this visit by Provider  Tobacco  Allergies  Meds  Problems  Med Hx  Surg Hx  Fam Hx         Social History     Tobacco Use     Smoking status: Never Smoker     Smokeless tobacco: Never Used   Substance Use Topics     Alcohol use: No     If you drink alcohol do you typically have >3 drinks per day or >7 drinks per week? No    Alcohol Use 9/28/2021   Prescreen: >3 drinks/day or >7 drinks/week? -   Prescreen: >3 drinks/day or >7 drinks/week? No       Was noted to have an elevated alk phos. Had work up which eventually showed gallstones. She still has her gallstones. Due for recheck of labs    Has had a partial thyroidectomy. Would like to see how thyroid lab is. Has lost weight. Has been exercising regularly and cooking at home. Eating lots of vegetables now.     History of NSTEMI under the care of cardiology at [place]. No chest pain, shortness of breath.         Current providers sharing in care for this patient include:   Patient Care Team:  Brittnee Ayala MD as PCP - General  Brittnee Ayala MD as Assigned PCP    The following health maintenance items are reviewed in Epic and correct as of today:  Health Maintenance Due   Topic Date Due     Pneumococcal Vaccine: 65+ Years (1 of 2 - PPSV23) Never done     DTAP/TDAP/TD IMMUNIZATION (1 - Tdap) Never done     ZOSTER IMMUNIZATION (1 of 2) Never done     MAMMO SCREENING  05/26/2019     FALL RISK ASSESSMENT  07/11/2019     INFLUENZA VACCINE (1) Never done     BP Readings from Last 3 Encounters:   09/28/21 132/78   11/13/19 132/72   04/30/19 (!) 181/103    Wt Readings from Last 3 Encounters:   09/28/21 61.6 kg (135 lb 14.4 oz)   11/13/19 69.9 kg (154 lb 3.2 oz)   04/30/19 68 kg (150 lb)               Due for flu shot, pneumococcal, shingrix      Review of Systems  CONSTITUTIONAL: NEGATIVE for fever, chills, change in  "weight  INTEGUMENTARY/SKIN: NEGATIVE for worrisome rashes, moles or lesions  EYES: NEGATIVE for vision changes or irritation  ENT/MOUTH: NEGATIVE for ear, mouth and throat problems  RESP: NEGATIVE for significant cough or SOB  BREAST: NEGATIVE for masses, tenderness or discharge  CV: NEGATIVE for chest pain, palpitations or peripheral edema  GI: NEGATIVE for nausea, abdominal pain, heartburn, or change in bowel habits  : NEGATIVE for frequency, dysuria, or hematuria  MUSCULOSKELETAL: NEGATIVE for significant arthralgias or myalgia  NEURO: NEGATIVE for weakness, dizziness or paresthesias  ENDOCRINE: NEGATIVE for temperature intolerance, skin/hair changes  HEME: NEGATIVE for bleeding problems  PSYCHIATRIC: NEGATIVE for changes in mood or affect    OBJECTIVE:   /78   Pulse 87   Temp 96.9  F (36.1  C) (Temporal)   Resp 14   Ht 1.628 m (5' 4.09\")   Wt 61.6 kg (135 lb 14.4 oz)   SpO2 99%   BMI 23.26 kg/m   Estimated body mass index is 23.26 kg/m  as calculated from the following:    Height as of this encounter: 1.628 m (5' 4.09\").    Weight as of this encounter: 61.6 kg (135 lb 14.4 oz).  Physical Exam  GENERAL APPEARANCE: healthy, alert and no distress  EYES: Eyes grossly normal to inspection, PERRL and conjunctivae and sclerae normal  HENT: ear canals and TM's normal, patient uncomfortable with removal of mask.no exam of mouth or nose.  NECK: no adenopathy, no asymmetry, masses, or scars and thyroid normal to palpation  RESP: lungs clear to auscultation - no rales, rhonchi or wheezes  CV: regular rate and rhythm, normal S1 S2, no S3 or S4, no murmur, click or rub, no peripheral edema and peripheral pulses strong  ABDOMEN: soft, nontender, no hepatosplenomegaly, no masses and bowel sounds normal  MS: no musculoskeletal defects are noted and gait is age appropriate without ataxia. Possible ganglion cyst right anterior ankle.   SKIN: no suspicious lesions or rashes  NEURO: Normal strength and tone, sensory " "exam grossly normal, mentation intact and speech normal  PSYCH: mentation appears normal and affect normal/bright    Diagnostic Test Results:  Labs reviewed in Epic    ASSESSMENT / PLAN:   (Z00.00) Encounter for Medicare annual wellness exam  (primary encounter diagnosis)  Comment: See counseling messages   Plan: recheck in one year.   Discussed pneumococcal and flu shot. She will return at a different date for that.   She is interested in having a booster for COVID. Will be done at a later date.       (R74.8) Elevated serum alkaline phosphatase level  Comment: patient with history. She is due for recheck at this time.   Plan: Comprehensive metabolic panel (BMP + Alb, Alk         Phos, ALT, AST, Total. Bili, TP)        Will notify of results.     (Z98.890) S/P partial thyroidectomy  Comment: check TSH today.   Plan: Will notify of results.       (E78.5) Hyperlipidemia LDL goal <70  Comment: due for recheck of lab. Fasting.   Plan: Lipid panel reflex to direct LDL Fasting        Will notify of results.     (Z13.1) Screening for diabetes mellitus  Comment: fasting  Plan: Comprehensive metabolic panel (BMP + Alb, Alk         Phos, ALT, AST, Total. Bili, TP)        Will notify of results.     (Z12.31) Encounter for screening mammogram for breast cancer  Comment: patient reports she does this with her ob/gyn  Plan: follow up with ob/gyn    (Z13.29) Screening for thyroid disorder  Comment: as above  Plan: TSH with free T4 reflex        Will notify of results.       Patient has been advised of split billing requirements and indicates understanding: Yes  COUNSELING:  Reviewed preventive health counseling, as reflected in patient instructions       Regular exercise       Healthy diet/nutrition    Estimated body mass index is 23.26 kg/m  as calculated from the following:    Height as of this encounter: 1.628 m (5' 4.09\").    Weight as of this encounter: 61.6 kg (135 lb 14.4 oz).        She reports that she has never smoked. " She has never used smokeless tobacco.      Appropriate preventive services were discussed with this patient, including applicable screening as appropriate for cardiovascular disease, diabetes, osteopenia/osteoporosis, and glaucoma.  As appropriate for age/gender, discussed screening for colorectal cancer, prostate cancer, breast cancer, and cervical cancer. Checklist reviewing preventive services available has been given to the patient.    Reviewed patients plan of care and provided an AVS. The Basic Care Plan (routine screening as documented in Health Maintenance) for Erinn meets the Care Plan requirement. This Care Plan has been established and reviewed with the Patient.    Counseling Resources:  ATP IV Guidelines  Pooled Cohorts Equation Calculator  Breast Cancer Risk Calculator  Breast Cancer: Medication to Reduce Risk  FRAX Risk Assessment  ICSI Preventive Guidelines  Dietary Guidelines for Americans, 2010  [employer]'s MyPlate  ASA Prophylaxis  Lung CA Screening    Brittnee Ayala MD  [facility]    Identified Health Risks:

## 2021-09-27 NOTE — PATIENT INSTRUCTIONS
Patient Education   Personalized Prevention Plan  You are due for the preventive services outlined below.  Your care team is available to assist you in scheduling these services.  If you have already completed any of these items, please share that information with your care team to update in your medical record.  Health Maintenance Due   Topic Date Due     ANNUAL REVIEW OF HM ORDERS  Never done     Pneumococcal Vaccine (1 of 2 - PPSV23) Never done     Diptheria Tetanus Pertussis (DTAP/TDAP/TD) Vaccine (1 - Tdap) Never done     Zoster (Shingles) Vaccine (1 of 2) Never done     Discuss Advance Care Planning  12/16/2018     Mammogram  05/26/2019     FALL RISK ASSESSMENT  07/11/2019     Flu Vaccine (1) Never done

## 2021-09-28 ENCOUNTER — MYC MEDICAL ADVICE (OUTPATIENT)
Dept: FAMILY MEDICINE | Facility: CLINIC | Age: 71
End: 2021-09-28

## 2021-09-28 ENCOUNTER — OFFICE VISIT (OUTPATIENT)
Dept: FAMILY MEDICINE | Facility: CLINIC | Age: 71
End: 2021-09-28
Payer: COMMERCIAL

## 2021-09-28 VITALS
TEMPERATURE: 96.9 F | HEIGHT: 64 IN | BODY MASS INDEX: 23.2 KG/M2 | RESPIRATION RATE: 14 BRPM | SYSTOLIC BLOOD PRESSURE: 132 MMHG | HEART RATE: 87 BPM | OXYGEN SATURATION: 99 % | DIASTOLIC BLOOD PRESSURE: 78 MMHG | WEIGHT: 135.9 LBS

## 2021-09-28 DIAGNOSIS — Z00.00 ENCOUNTER FOR MEDICARE ANNUAL WELLNESS EXAM: Primary | ICD-10-CM

## 2021-09-28 DIAGNOSIS — R74.8 ELEVATED SERUM ALKALINE PHOSPHATASE LEVEL: ICD-10-CM

## 2021-09-28 DIAGNOSIS — Z13.1 SCREENING FOR DIABETES MELLITUS: ICD-10-CM

## 2021-09-28 DIAGNOSIS — Z12.31 ENCOUNTER FOR SCREENING MAMMOGRAM FOR BREAST CANCER: ICD-10-CM

## 2021-09-28 DIAGNOSIS — E89.0 S/P PARTIAL THYROIDECTOMY: ICD-10-CM

## 2021-09-28 DIAGNOSIS — Z13.29 SCREENING FOR THYROID DISORDER: ICD-10-CM

## 2021-09-28 DIAGNOSIS — E78.5 HYPERLIPIDEMIA LDL GOAL <70: ICD-10-CM

## 2021-09-28 PROBLEM — I25.2 HISTORY OF NON-ST ELEVATION MYOCARDIAL INFARCTION (NSTEMI): Status: ACTIVE | Noted: 2019-11-13

## 2021-09-28 LAB
ALBUMIN SERPL-MCNC: 3.7 G/DL (ref 3.4–5)
ALP SERPL-CCNC: 109 U/L (ref 40–150)
ALT SERPL W P-5'-P-CCNC: 44 U/L (ref 0–50)
ANION GAP SERPL CALCULATED.3IONS-SCNC: 2 MMOL/L (ref 3–14)
AST SERPL W P-5'-P-CCNC: 36 U/L (ref 0–45)
BILIRUB SERPL-MCNC: 0.6 MG/DL (ref 0.2–1.3)
BUN SERPL-MCNC: 18 MG/DL (ref 7–30)
CALCIUM SERPL-MCNC: 8.9 MG/DL (ref 8.5–10.1)
CHLORIDE BLD-SCNC: 108 MMOL/L (ref 94–109)
CHOLEST SERPL-MCNC: 142 MG/DL
CO2 SERPL-SCNC: 28 MMOL/L (ref 20–32)
CREAT SERPL-MCNC: 0.75 MG/DL (ref 0.52–1.04)
FASTING STATUS PATIENT QL REPORTED: YES
GFR SERPL CREATININE-BSD FRML MDRD: 80 ML/MIN/1.73M2
GLUCOSE BLD-MCNC: 97 MG/DL (ref 70–99)
HDLC SERPL-MCNC: 57 MG/DL
LDLC SERPL CALC-MCNC: 67 MG/DL
NONHDLC SERPL-MCNC: 85 MG/DL
POTASSIUM BLD-SCNC: 4.7 MMOL/L (ref 3.4–5.3)
PROT SERPL-MCNC: 7.3 G/DL (ref 6.8–8.8)
SODIUM SERPL-SCNC: 138 MMOL/L (ref 133–144)
T4 FREE SERPL-MCNC: 0.99 NG/DL (ref 0.76–1.46)
TRIGL SERPL-MCNC: 89 MG/DL
TSH SERPL DL<=0.005 MIU/L-ACNC: 5.71 MU/L (ref 0.4–4)

## 2021-09-28 PROCEDURE — 84443 ASSAY THYROID STIM HORMONE: CPT | Performed by: FAMILY MEDICINE

## 2021-09-28 PROCEDURE — 80053 COMPREHEN METABOLIC PANEL: CPT | Performed by: FAMILY MEDICINE

## 2021-09-28 PROCEDURE — 36415 COLL VENOUS BLD VENIPUNCTURE: CPT | Performed by: FAMILY MEDICINE

## 2021-09-28 PROCEDURE — 80061 LIPID PANEL: CPT | Performed by: FAMILY MEDICINE

## 2021-09-28 PROCEDURE — 99397 PER PM REEVAL EST PAT 65+ YR: CPT | Performed by: FAMILY MEDICINE

## 2021-09-28 PROCEDURE — 84439 ASSAY OF FREE THYROXINE: CPT | Performed by: FAMILY MEDICINE

## 2021-09-28 RX ORDER — UBIDECARENONE 50 MG
CAPSULE ORAL
COMMUNITY
End: 2023-02-14

## 2021-09-28 RX ORDER — ATORVASTATIN CALCIUM 20 MG/1
20 TABLET, FILM COATED ORAL
COMMUNITY
Start: 2020-10-30 | End: 2024-04-30

## 2021-09-28 RX ORDER — NITROGLYCERIN 0.4 MG/1
0.4 TABLET SUBLINGUAL
COMMUNITY
Start: 2019-11-08

## 2021-09-28 ASSESSMENT — PAIN SCALES - GENERAL: PAINLEVEL: NO PAIN (0)

## 2021-09-28 ASSESSMENT — MIFFLIN-ST. JEOR: SCORE: 1117.93

## 2021-10-25 ENCOUNTER — TELEPHONE (OUTPATIENT)
Dept: FAMILY MEDICINE | Facility: CLINIC | Age: 71
End: 2021-10-25
Payer: COMMERCIAL

## 2021-10-25 NOTE — TELEPHONE ENCOUNTER
Patient Quality Outreach Summary      Summary:    Patient is due/failing the following:   Breast Cancer Screening - Mammogram    Type of outreach:    abstracted    Questions for provider review:    None                                                                                                                    Stacy Blanca CMA       Chart routed to Care Team.

## 2021-12-22 ENCOUNTER — MYC MEDICAL ADVICE (OUTPATIENT)
Dept: FAMILY MEDICINE | Facility: CLINIC | Age: 71
End: 2021-12-22
Payer: COMMERCIAL

## 2021-12-22 DIAGNOSIS — E78.5 HYPERLIPIDEMIA LDL GOAL <70: Primary | ICD-10-CM

## 2022-01-17 ENCOUNTER — LAB (OUTPATIENT)
Dept: LAB | Facility: CLINIC | Age: 72
End: 2022-01-17
Payer: COMMERCIAL

## 2022-01-17 DIAGNOSIS — E78.5 HYPERLIPIDEMIA LDL GOAL <70: ICD-10-CM

## 2022-01-17 LAB
CHOLEST SERPL-MCNC: 159 MG/DL
FASTING STATUS PATIENT QL REPORTED: YES
HDLC SERPL-MCNC: 53 MG/DL
LDLC SERPL CALC-MCNC: 82 MG/DL
NONHDLC SERPL-MCNC: 106 MG/DL
TRIGL SERPL-MCNC: 122 MG/DL

## 2022-01-17 PROCEDURE — 36415 COLL VENOUS BLD VENIPUNCTURE: CPT

## 2022-01-17 PROCEDURE — 80061 LIPID PANEL: CPT

## 2022-03-29 ENCOUNTER — MYC MEDICAL ADVICE (OUTPATIENT)
Dept: FAMILY MEDICINE | Facility: CLINIC | Age: 72
End: 2022-03-29
Payer: COMMERCIAL

## 2022-03-29 DIAGNOSIS — E78.5 HYPERLIPIDEMIA LDL GOAL <70: Primary | ICD-10-CM

## 2022-03-30 NOTE — TELEPHONE ENCOUNTER
Lipids done 1/17/22. Patient is requesting order to recheck levels. Patient is followed by cardiology. She reduced the Lipitor to 10mg in October 2021.     Cholesterol   Date Value Ref Range Status   01/17/2022 159 <200 mg/dL Final   09/28/2021 142 <200 mg/dL Final   05/26/2021 141 <200 mg/dL Final   11/18/2020 151 <200 mg/dL Final     HDL Cholesterol   Date Value Ref Range Status   05/26/2021 59 >49 mg/dL Final   11/18/2020 58 >49 mg/dL Final     Direct Measure HDL   Date Value Ref Range Status   01/17/2022 53 >=50 mg/dL Final   09/28/2021 57 >=50 mg/dL Final     LDL Cholesterol Calculated   Date Value Ref Range Status   01/17/2022 82 <=100 mg/dL Final   09/28/2021 67 <=100 mg/dL Final   05/26/2021 64 <100 mg/dL Final     Comment:     Desirable:       <100 mg/dl   11/18/2020 73 <100 mg/dL Final     Comment:     Desirable:       <100 mg/dl     Triglycerides   Date Value Ref Range Status   01/17/2022 122 <150 mg/dL Final   09/28/2021 89 <150 mg/dL Final   05/26/2021 89 <150 mg/dL Final   11/18/2020 99 <150 mg/dL Final     Cholesterol/HDL Ratio   Date Value Ref Range Status   11/01/2011 5.1 (H) 0.0 - 5.0 Final   11/15/2010 5.9 (H) 0.0 - 5.0 Final

## 2022-04-13 ENCOUNTER — LAB (OUTPATIENT)
Dept: LAB | Facility: CLINIC | Age: 72
End: 2022-04-13
Payer: COMMERCIAL

## 2022-04-13 DIAGNOSIS — E78.5 HYPERLIPIDEMIA LDL GOAL <70: ICD-10-CM

## 2022-04-13 LAB
CHOLEST SERPL-MCNC: 137 MG/DL
FASTING STATUS PATIENT QL REPORTED: YES
HDLC SERPL-MCNC: 53 MG/DL
LDLC SERPL CALC-MCNC: 64 MG/DL
NONHDLC SERPL-MCNC: 84 MG/DL
TRIGL SERPL-MCNC: 99 MG/DL

## 2022-04-13 PROCEDURE — 36415 COLL VENOUS BLD VENIPUNCTURE: CPT

## 2022-04-13 PROCEDURE — 80061 LIPID PANEL: CPT

## 2022-05-10 ENCOUNTER — TELEPHONE (OUTPATIENT)
Dept: FAMILY MEDICINE | Facility: OTHER | Age: 72
End: 2022-05-10

## 2022-05-10 NOTE — TELEPHONE ENCOUNTER
"Pt is scheduled to see CDL today. Provider reviewed the notes from [facility] in Care Everywhere. Pt needs to be seen for cardiac work up. Pt states that her cardiologist, Dr. Zapata, called and informed her that they don't believe that it is cardiac related, however, if pt wanted \"reassurance\" she could go to her PCP clinic and get EKG and labs. This note is not reflected in pt's messages through Care Everywhere-she states that provider called her directly after she had talked to the nurses. Attempted to huddle with provider but was unable. Advised pt that there are no labs present and certain labs that are specific to cardiology (like troponins) we do not do in the clinic. Reiterated that if pt continues to have symptoms, or if worsening, that she be seen in ED. Pt verbalizes understanding and is in agreement with this. Appointment canceled.     Selena Hinds, PANKAJN, RN, PHN  Registered Nurse-Clinic Triage  [facility]  5/10/2022 at 2:04 PM      "

## 2022-07-26 ENCOUNTER — TRANSFERRED RECORDS (OUTPATIENT)
Dept: FAMILY MEDICINE | Facility: CLINIC | Age: 72
End: 2022-07-26

## 2023-02-09 ASSESSMENT — ENCOUNTER SYMPTOMS
EYE PAIN: 0
NERVOUS/ANXIOUS: 1
SHORTNESS OF BREATH: 0
BREAST MASS: 0
COUGH: 0
FEVER: 0
SORE THROAT: 0
DYSURIA: 0
NAUSEA: 0
JOINT SWELLING: 0
HEMATOCHEZIA: 0
HEMATURIA: 0
MYALGIAS: 0
HEADACHES: 0
PARESTHESIAS: 0
DIZZINESS: 0
WEAKNESS: 0
CHILLS: 0
HEARTBURN: 0
CONSTIPATION: 1
DIARRHEA: 0
PALPITATIONS: 0
ABDOMINAL PAIN: 0
FREQUENCY: 0
ARTHRALGIAS: 0

## 2023-02-09 ASSESSMENT — ACTIVITIES OF DAILY LIVING (ADL): CURRENT_FUNCTION: NO ASSISTANCE NEEDED

## 2023-02-14 ENCOUNTER — LAB (OUTPATIENT)
Dept: FAMILY MEDICINE | Facility: CLINIC | Age: 73
End: 2023-02-14

## 2023-02-14 ENCOUNTER — OFFICE VISIT (OUTPATIENT)
Dept: FAMILY MEDICINE | Facility: CLINIC | Age: 73
End: 2023-02-14
Payer: COMMERCIAL

## 2023-02-14 VITALS
HEART RATE: 89 BPM | HEIGHT: 64 IN | DIASTOLIC BLOOD PRESSURE: 68 MMHG | OXYGEN SATURATION: 97 % | BODY MASS INDEX: 23.56 KG/M2 | SYSTOLIC BLOOD PRESSURE: 120 MMHG | TEMPERATURE: 97 F | WEIGHT: 138 LBS | RESPIRATION RATE: 12 BRPM

## 2023-02-14 DIAGNOSIS — Z00.00 ENCOUNTER FOR MEDICARE ANNUAL WELLNESS EXAM: Primary | ICD-10-CM

## 2023-02-14 DIAGNOSIS — Z12.11 SCREEN FOR COLON CANCER: ICD-10-CM

## 2023-02-14 DIAGNOSIS — Z79.899 ENCOUNTER FOR LONG-TERM (CURRENT) USE OF MEDICATIONS: ICD-10-CM

## 2023-02-14 DIAGNOSIS — I25.2 HISTORY OF NON-ST ELEVATION MYOCARDIAL INFARCTION (NSTEMI): ICD-10-CM

## 2023-02-14 DIAGNOSIS — Z12.31 ENCOUNTER FOR SCREENING MAMMOGRAM FOR BREAST CANCER: ICD-10-CM

## 2023-02-14 DIAGNOSIS — E89.0 S/P PARTIAL THYROIDECTOMY: ICD-10-CM

## 2023-02-14 DIAGNOSIS — E78.5 HYPERLIPIDEMIA LDL GOAL <70: ICD-10-CM

## 2023-02-14 LAB
ALBUMIN SERPL-MCNC: 3.7 G/DL (ref 3.4–5)
ALP SERPL-CCNC: 112 U/L (ref 40–150)
ALT SERPL W P-5'-P-CCNC: 49 U/L (ref 0–50)
ANION GAP SERPL CALCULATED.3IONS-SCNC: 4 MMOL/L (ref 3–14)
AST SERPL W P-5'-P-CCNC: 36 U/L (ref 0–45)
BILIRUB SERPL-MCNC: 0.6 MG/DL (ref 0.2–1.3)
BUN SERPL-MCNC: 16 MG/DL (ref 7–30)
CALCIUM SERPL-MCNC: 9.1 MG/DL (ref 8.5–10.1)
CHLORIDE BLD-SCNC: 107 MMOL/L (ref 94–109)
CHOLEST SERPL-MCNC: 127 MG/DL
CO2 SERPL-SCNC: 28 MMOL/L (ref 20–32)
CREAT SERPL-MCNC: 0.71 MG/DL (ref 0.52–1.04)
FASTING STATUS PATIENT QL REPORTED: NORMAL
GFR SERPL CREATININE-BSD FRML MDRD: 89 ML/MIN/1.73M2
GLUCOSE BLD-MCNC: 96 MG/DL (ref 70–99)
HDLC SERPL-MCNC: 55 MG/DL
LDLC SERPL CALC-MCNC: 54 MG/DL
NONHDLC SERPL-MCNC: 72 MG/DL
POTASSIUM BLD-SCNC: 3.9 MMOL/L (ref 3.4–5.3)
PROT SERPL-MCNC: 7.1 G/DL (ref 6.8–8.8)
SODIUM SERPL-SCNC: 139 MMOL/L (ref 133–144)
T4 FREE SERPL-MCNC: 1.09 NG/DL (ref 0.76–1.46)
TRIGL SERPL-MCNC: 90 MG/DL
TSH SERPL DL<=0.005 MIU/L-ACNC: 5.84 MU/L (ref 0.4–4)

## 2023-02-14 PROCEDURE — 84443 ASSAY THYROID STIM HORMONE: CPT | Performed by: FAMILY MEDICINE

## 2023-02-14 PROCEDURE — 84439 ASSAY OF FREE THYROXINE: CPT | Performed by: FAMILY MEDICINE

## 2023-02-14 PROCEDURE — 90677 PCV20 VACCINE IM: CPT | Performed by: FAMILY MEDICINE

## 2023-02-14 PROCEDURE — 80061 LIPID PANEL: CPT | Performed by: FAMILY MEDICINE

## 2023-02-14 PROCEDURE — G0439 PPPS, SUBSEQ VISIT: HCPCS | Performed by: FAMILY MEDICINE

## 2023-02-14 PROCEDURE — 36415 COLL VENOUS BLD VENIPUNCTURE: CPT | Performed by: FAMILY MEDICINE

## 2023-02-14 PROCEDURE — 80053 COMPREHEN METABOLIC PANEL: CPT | Performed by: FAMILY MEDICINE

## 2023-02-14 PROCEDURE — G0009 ADMIN PNEUMOCOCCAL VACCINE: HCPCS | Performed by: FAMILY MEDICINE

## 2023-02-14 PROCEDURE — 99213 OFFICE O/P EST LOW 20 MIN: CPT | Mod: 25 | Performed by: FAMILY MEDICINE

## 2023-02-14 ASSESSMENT — ENCOUNTER SYMPTOMS
CHILLS: 0
PALPITATIONS: 0
COUGH: 0
WEAKNESS: 0
EYE PAIN: 0
DYSURIA: 0
SHORTNESS OF BREATH: 0
CONSTIPATION: 1
ARTHRALGIAS: 0
SORE THROAT: 0
HEARTBURN: 0
FREQUENCY: 0
MYALGIAS: 0
BREAST MASS: 0
FEVER: 0
DIARRHEA: 0
PARESTHESIAS: 0
HEMATOCHEZIA: 0
HEADACHES: 0
ABDOMINAL PAIN: 0
HEMATURIA: 0
DIZZINESS: 0
NERVOUS/ANXIOUS: 1
JOINT SWELLING: 0
NAUSEA: 0

## 2023-02-14 ASSESSMENT — ACTIVITIES OF DAILY LIVING (ADL): CURRENT_FUNCTION: NO ASSISTANCE NEEDED

## 2023-02-14 ASSESSMENT — PAIN SCALES - GENERAL: PAINLEVEL: NO PAIN (0)

## 2023-02-14 NOTE — PATIENT INSTRUCTIONS
Patient Education   Personalized Prevention Plan  You are due for the preventive services outlined below.  Your care team is available to assist you in scheduling these services.  If you have already completed any of these items, please share that information with your care team to update in your medical record.  Health Maintenance Due   Topic Date Due     Diptheria Tetanus Pertussis (DTAP/TDAP/TD) Vaccine (1 - Tdap) Never done     Zoster (Shingles) Vaccine (1 of 2) Never done     Pneumococcal Vaccine (1 - PCV) Never done     Osteoporosis Screening  11/06/2021     Flu Vaccine (1) Never done     Mammogram  10/14/2022       Signs of Hearing Loss      Hearing much better with one ear can be a sign of hearing loss.   Hearing loss is a problem shared by many people. In fact, it is one of the most common health problems, particularly as people age. Most people age 65 and older have some hearing loss. By age 80, almost everyone does. Hearing loss often occurs slowly over the years. So you may not realize your hearing has gotten worse.  Have your hearing checked  Call your healthcare provider if you:    Have to strain to hear normal conversation    Have to watch other people s faces very carefully to follow what they re saying    Need to ask people to repeat what they ve said    Often misunderstand what people are saying    Turn the volume of the television or radio up so high that others complain    Feel that people are mumbling when they re talking to you    Find that the effort to hear leaves you feeling tired and irritated    Notice, when using the phone, that you hear better with one ear than the other  [employer] last reviewed this educational content on 1/1/2020 2000-2021 [employer]. All rights reserved. This information is not intended as a substitute for professional medical care. Always follow your healthcare professional's instructions.

## 2023-02-14 NOTE — PROGRESS NOTES
"SUBJECTIVE:   Erinn is a 73 year old who presents for Preventive Visit.    Patient has been advised of split billing requirements and indicates understanding: Yes  Are you in the first 12 months of your Medicare coverage?  No    Healthy Habits:     In general, how would you rate your overall health?  Good    Frequency of exercise:  4-5 days/week    Duration of exercise:  15-30 minutes    Do you usually eat at least 4 servings of fruit and vegetables a day, include whole grains    & fiber and avoid regularly eating high fat or \"junk\" foods?  Yes    Taking medications regularly:  Yes    Ability to successfully perform activities of daily living:  No assistance needed    Home Safety:  No safety concerns identified    Hearing Impairment:  Difficulty following a conversation in a noisy restaurant or crowded room, need to ask people to speak up or repeat themselves and difficulty understanding soft or whispered speech    In the past 6 months, have you been bothered by leaking of urine?  No    In general, how would you rate your overall mental or emotional health?  Good      PHQ-2 Total Score: 0    Additional concerns today:  Yes      Have you ever done Advance Care Planning? (For example, a Health Directive, POLST, or a discussion with a medical provider or your loved ones about your wishes): No, advance care planning information given to patient to review.  Patient declined advance care planning discussion at this time.       Fall risk  Fallen 2 or more times in the past year?: No  Any fall with injury in the past year?: No    Cognitive Screening   1) Repeat 3 items (Leader, Season, Table)    2) Clock draw: NORMAL  3) 3 item recall: Recalls 3 objects  Results: 3 items recalled: COGNITIVE IMPAIRMENT LESS LIKELY    Mini-CogTM Copyright KERRY Pacheco. Licensed by the author for use in [facility]; reprinted with permission ([email or web address]). All rights reserved.      Do you have sleep apnea, excessive snoring or daytime " drowsiness?: no    Reviewed and updated as needed this visit by clinical staff   Tobacco  Allergies  Meds              Reviewed and updated as needed this visit by Provider                 Social History     Tobacco Use     Smoking status: Never     Smokeless tobacco: Never   Substance Use Topics     Alcohol use: No         Alcohol Use 2/9/2023   Prescreen: >3 drinks/day or >7 drinks/week? Not Applicable   Prescreen: >3 drinks/day or >7 drinks/week? -               Current providers sharing in care for this patient include:   Patient Care Team:  Brittnee Ayala MD as PCP - General    The following health maintenance items are reviewed in Epic and correct as of today:  Health Maintenance   Topic Date Due     DTAP/TDAP/TD IMMUNIZATION (1 - Tdap) Never done     ZOSTER IMMUNIZATION (1 of 2) Never done     Pneumococcal Vaccine: 65+ Years (1 - PCV) Never done     DEXA  11/06/2021     INFLUENZA VACCINE (1) Never done     MEDICARE ANNUAL WELLNESS VISIT  09/28/2022     ANNUAL REVIEW OF HM ORDERS  09/28/2022     MAMMO SCREENING  10/14/2022     COLORECTAL CANCER SCREENING  09/13/2023     FALL RISK ASSESSMENT  02/14/2024     ADVANCE CARE PLANNING  09/28/2026     LIPID  04/13/2027     HEPATITIS C SCREENING  Completed     PHQ-2 (once per calendar year)  Completed     COVID-19 Vaccine  Completed     IPV IMMUNIZATION  Aged Out     MENINGITIS IMMUNIZATION  Aged Out     BP Readings from Last 3 Encounters:   02/14/23 120/68   09/28/21 132/78   11/13/19 132/72    Wt Readings from Last 3 Encounters:   02/14/23 62.6 kg (138 lb)   09/28/21 61.6 kg (135 lb 14.4 oz)   11/13/19 69.9 kg (154 lb 3.2 oz)         Hyperlipidemia Follow-Up      Are you regularly taking any medication or supplement to lower your cholesterol?   Yes- atorvastatin    Are you having muscle aches or other side effects that you think could be caused by your cholesterol lowering medication?  No    History of NSTEMI.  No chest pain or shortness of breath.  No  "problems with swelling.        Status post partial thyroidectomy  Periodically checking in on thyroid.  She is not having any new symptoms or concerns at this point.  No change with activity level or energy.  No weight changes.        Review of Systems   Constitutional: Negative for chills and fever.   HENT: Positive for hearing loss. Negative for congestion, ear pain and sore throat.    Eyes: Negative for pain and visual disturbance.   Respiratory: Negative for cough and shortness of breath.    Cardiovascular: Negative for chest pain, palpitations and peripheral edema.   Gastrointestinal: Positive for constipation. Negative for abdominal pain, diarrhea, heartburn, hematochezia and nausea.   Breasts:  Negative for tenderness, breast mass and discharge.   Genitourinary: Negative for dysuria, frequency, genital sores, hematuria, pelvic pain, urgency, vaginal bleeding and vaginal discharge.   Musculoskeletal: Negative for arthralgias, joint swelling and myalgias.   Skin: Negative for rash.   Neurological: Negative for dizziness, weakness, headaches and paresthesias.   Psychiatric/Behavioral: Negative for mood changes. The patient is nervous/anxious.          OBJECTIVE:   BP (!) 150/74   Pulse 89   Temp 97  F (36.1  C) (Temporal)   Resp 12   Ht 1.62 m (5' 3.78\")   Wt 62.6 kg (138 lb)   LMP  (LMP Unknown)   SpO2 97%   Breastfeeding No   BMI 23.85 kg/m   Estimated body mass index is 23.85 kg/m  as calculated from the following:    Height as of this encounter: 1.62 m (5' 3.78\").    Weight as of this encounter: 62.6 kg (138 lb).  Physical Exam  GENERAL APPEARANCE: healthy, alert and no distress  EYES: Eyes grossly normal to inspection, PERRL and conjunctivae and sclerae normal  HENT: ear canals and TM's normal  NECK: no adenopathy, no asymmetry, masses, or scars and thyroid normal to palpation  RESP: lungs clear to auscultation - no rales, rhonchi or wheezes  BREAST: normal without masses, tenderness or nipple " discharge and no palpable axillary masses or adenopathy  CV: regular rate and rhythm, normal S1 S2, no S3 or S4, no murmur, click or rub, no peripheral edema and peripheral pulses strong  ABDOMEN: soft, nontender, no hepatosplenomegaly, no masses and bowel sounds normal  MS: no musculoskeletal defects are noted and gait is age appropriate without ataxia  SKIN: no suspicious lesions or rashes  NEURO: Normal strength and tone, sensory exam grossly normal, mentation intact and speech normal  PSYCH: mentation appears normal and affect normal/bright    Diagnostic Test Results:  Labs reviewed in Epic    ASSESSMENT / PLAN:   (Z00.00) Encounter for Medicare annual wellness exam  (primary encounter diagnosis)  Comment: See counseling messages  Plan: Recheck in 1 year    (E78.5) Hyperlipidemia LDL goal <70  Comment: Patient is due for recheck of her lipid panel.  She is seeing her cardiologist next month.  She is having no issues with the medication.  Plan: Lipid panel reflex to direct LDL Fasting        We will notify results    (I25.2) History of non-ST elevation myocardial infarction (NSTEMI)  Comment: Patient with history of NSTEMI.  She is not currently having any problems with chest pain or shortness of breath.  She is able to be active without any concerns.  She is followed by cardiology and has appointment next month.  Plan: Continue follow-up with cardiology.    (E89.0) S/P partial thyroidectomy  Comment: Patient is not having new symptoms of thyroid issues.  We will recheck TSH at this point  Plan: TSH with free T4 reflex        We will notify results    (Z12.31) Encounter for screening mammogram for breast cancer  Comment: Due for mammogram  Plan: We will notify results    (Z12.11) Screen for colon cancer  Comment: Discussed colon cancer screening and she would like to proceed with Cologuard after discussion of different options.  Order placed.  Plan: COLOGUARD([employer])        Will notify of results.      (Z79.899) Encounter for long-term (current) use of medications  Comment: We will notify results  Plan: Comprehensive metabolic panel (BMP + Alb, Alk         Phos, ALT, AST, Total. Bili, TP)              Patient has been advised of split billing requirements and indicates understanding: Yes      COUNSELING:  Reviewed preventive health counseling, as reflected in patient instructions       Regular exercise       Healthy diet/nutrition        She reports that she has never smoked. She has never used smokeless tobacco.      Appropriate preventive services were discussed with this patient, including applicable screening as appropriate for cardiovascular disease, diabetes, osteopenia/osteoporosis, and glaucoma.  As appropriate for age/gender, discussed screening for colorectal cancer, prostate cancer, breast cancer, and cervical cancer. Checklist reviewing preventive services available has been given to the patient.    Reviewed patients plan of care and provided an AVS. The Basic Care Plan (routine screening as documented in Health Maintenance) for Erinn meets the Care Plan requirement. This Care Plan has been established and reviewed with the Patient.          Brittnee Ayala MD  [facility]    Identified Health Risks:    The patient was provided with written information regarding signs of hearing loss.

## 2023-03-06 LAB — NONINV COLON CA DNA+OCC BLD SCRN STL QL: NEGATIVE

## 2023-03-09 ENCOUNTER — TRANSFERRED RECORDS (OUTPATIENT)
Dept: MULTI SPECIALTY CLINIC | Facility: CLINIC | Age: 73
End: 2023-03-09

## 2023-04-22 ENCOUNTER — HEALTH MAINTENANCE LETTER (OUTPATIENT)
Age: 73
End: 2023-04-22

## 2023-05-27 ENCOUNTER — TELEPHONE (OUTPATIENT)
Dept: FAMILY MEDICINE | Facility: CLINIC | Age: 73
End: 2023-05-27
Payer: COMMERCIAL

## 2023-05-27 NOTE — LETTER
[facility]  [address], [address]  [place] [address]  Phone: [phone]  Fax: [phone]  May 27, 2023      Erinn Zamudio  [address] [place] [address]      Dear Erinn,    We care about your health and have reviewed your health plan including your medical conditions, medications, and lab results.  Based on this review, it is recommended that you follow up regarding the following health topic(s):  -Breast Cancer Screening    We recommend you take the following action(s):  -schedule a MAMMOGRAM which is due. Please disregard this reminder if you have had this exam elsewhere within the last 1-2 years please let us know so we can update your records.     Please call us at the [facility] [phone] (or use Button) to address the above recommendations.     Thank you for trusting [facility] and we appreciate the opportunity to serve you.  We look forward to supporting your healthcare needs in the future.    Healthy Regards,    Your Health Care Team  [facility]

## 2023-06-06 NOTE — TELEPHONE ENCOUNTER
FYI - Status Update    Who is Calling: patient    Update: Kory done at [facility] on March 9th2023    Does caller want a call/response back: No

## 2023-09-07 ENCOUNTER — LAB REQUISITION (OUTPATIENT)
Dept: LAB | Facility: CLINIC | Age: 73
End: 2023-09-07

## 2023-09-07 DIAGNOSIS — R31.9 HEMATURIA, UNSPECIFIED: ICD-10-CM

## 2023-09-07 PROCEDURE — 87088 URINE BACTERIA CULTURE: CPT | Performed by: OBSTETRICS & GYNECOLOGY

## 2023-09-10 LAB — BACTERIA UR CULT: ABNORMAL

## 2023-10-31 ENCOUNTER — LAB REQUISITION (OUTPATIENT)
Dept: LAB | Facility: CLINIC | Age: 73
End: 2023-10-31
Payer: COMMERCIAL

## 2023-10-31 DIAGNOSIS — R31.9 HEMATURIA, UNSPECIFIED: ICD-10-CM

## 2023-10-31 PROCEDURE — 87086 URINE CULTURE/COLONY COUNT: CPT | Performed by: NURSE PRACTITIONER

## 2023-11-01 LAB — BACTERIA UR CULT: NORMAL

## 2024-01-15 ENCOUNTER — PATIENT OUTREACH (OUTPATIENT)
Dept: CARE COORDINATION | Facility: CLINIC | Age: 74
End: 2024-01-15
Payer: COMMERCIAL

## 2024-01-29 ENCOUNTER — PATIENT OUTREACH (OUTPATIENT)
Dept: CARE COORDINATION | Facility: CLINIC | Age: 74
End: 2024-01-29
Payer: COMMERCIAL

## 2024-02-12 ENCOUNTER — TELEPHONE (OUTPATIENT)
Dept: FAMILY MEDICINE | Facility: CLINIC | Age: 74
End: 2024-02-12
Payer: COMMERCIAL

## 2024-02-12 NOTE — TELEPHONE ENCOUNTER
Left message for pt to return call, when call is returned help schedule ED follow up. Ok to use same day/approval only spots if needed for PCP.    Alyx Mejias CMA ([facility])

## 2024-02-12 NOTE — TELEPHONE ENCOUNTER
Reason for Call:  Appointment Request    Patient requesting this type of appt:  Hospital/ED Follow-Up     Requested provider: Brittnee Ayala    Reason patient unable to be scheduled: Not within requested timeframe    When does patient want to be seen/preferred time: 3-7 days    Comments: Patient states she was seen at [facility] on 2/11 for stomach issues/gastritis. She was instructed to be seen by Friday, 2/16. Patient would prefer to see her primary.      Could we send this information to you in [facility] or would you prefer to receive a phone call?:   Patient would prefer a phone call   Okay to leave a detailed message?: Yes at Cell number on file:    Telephone Information:   Mobile [phone]       Call taken on 2/12/2024 at 1:09 PM by Amy Mejias LPN

## 2024-02-14 ENCOUNTER — OFFICE VISIT (OUTPATIENT)
Dept: FAMILY MEDICINE | Facility: CLINIC | Age: 74
End: 2024-02-14
Payer: COMMERCIAL

## 2024-02-14 VITALS
WEIGHT: 139.4 LBS | HEART RATE: 94 BPM | OXYGEN SATURATION: 99 % | BODY MASS INDEX: 23.8 KG/M2 | DIASTOLIC BLOOD PRESSURE: 74 MMHG | SYSTOLIC BLOOD PRESSURE: 132 MMHG | HEIGHT: 64 IN | RESPIRATION RATE: 14 BRPM | TEMPERATURE: 98 F

## 2024-02-14 DIAGNOSIS — I25.2 HISTORY OF NON-ST ELEVATION MYOCARDIAL INFARCTION (NSTEMI): ICD-10-CM

## 2024-02-14 DIAGNOSIS — R10.13 EPIGASTRIC PAIN: Primary | ICD-10-CM

## 2024-02-14 DIAGNOSIS — N30.91 CYSTITIS, UNSPECIFIED WITH HEMATURIA: ICD-10-CM

## 2024-02-14 DIAGNOSIS — I77.9 CAROTID ARTERY DISEASE, UNSPECIFIED LATERALITY, UNSPECIFIED TYPE (H): ICD-10-CM

## 2024-02-14 PROCEDURE — 99214 OFFICE O/P EST MOD 30 MIN: CPT | Performed by: FAMILY MEDICINE

## 2024-02-14 PROCEDURE — 36415 COLL VENOUS BLD VENIPUNCTURE: CPT | Performed by: FAMILY MEDICINE

## 2024-02-14 PROCEDURE — 80048 BASIC METABOLIC PNL TOTAL CA: CPT | Performed by: FAMILY MEDICINE

## 2024-02-14 RX ORDER — CEPHALEXIN 500 MG/1
500 CAPSULE ORAL 2 TIMES DAILY
Qty: 14 CAPSULE | Refills: 0 | Status: SHIPPED | OUTPATIENT
Start: 2024-02-14 | End: 2024-02-21

## 2024-02-14 ASSESSMENT — PAIN SCALES - GENERAL: PAINLEVEL: NO PAIN (0)

## 2024-02-14 NOTE — PROGRESS NOTES
Assessment & Plan     Epigastric pain  Reviewed recent ED notes and results with the patient.  Overall reassuring.  Discussed the Pepcid and recommend taking but patient is very resistant to use after the letter she received regarding it.  Discussed other options and she would like to try omeprazole as she has used this in the past.  Recommend taking for the next 4 weeks and we will set up a recheck in the office.  Will also recheck BMP due to some decrease noted on GFR and ordered H. pylori for further evaluation.  Patient will reach out for worsening pain, blood in the stool or other changes.  - omeprazole (PRILOSEC) 20 MG DR capsule; Take 1 capsule (20 mg) by mouth daily  - Basic metabolic panel  (Ca, Cl, CO2, Creat, Gluc, K, Na, BUN); Future  - Helicobacter pylori Antigen Stool; Future  - Basic metabolic panel  (Ca, Cl, CO2, Creat, Gluc, K, Na, BUN)  - Helicobacter pylori Antigen Stool    History of non-ST elevation myocardial infarction (NSTEMI)  Patient has been doing well.  No changes in symptoms for her.  Has not needed nitroglycerin.  Follows with cardiology.    Cystitis, unspecified with hematuria  Patient has history of UTIs.  Did give her prescription to use for antibiotic for when she is out of town if needed I recommend that if she is in town that she at least put through an e-visit so we can get a UA.  - cephALEXin (KEFLEX) 500 MG capsule; Take 1 capsule (500 mg) by mouth 2 times daily for 7 days    Carotid artery disease, unspecified laterality, unspecified type (H24)  See above          MED REC REQUIRED  Post Medication Reconciliation Status: discharge medications reconciled and changed, per note/orders        Manny Plasencia is a 74 year old, presenting for the following health issues:  Hospital F/U        2/14/2024     3:18 PM   Additional Questions   Roomed by Bernadette HESTER   Accompanied by None         2/14/2024     3:18 PM   Patient Reported Additional Medications   Patient reports taking the  following new medications NA     HPI         ED/UC Followup:    Facility:  [facility]  Date of visit: 2/11/24  Reason for visit: Abdominal pain   Current Status: stable     Patient is here for follow-up from the emergency department.  She developed some upper abdominal pain recently.  Send it was significant in the upper part of her abdomen.  No nausea or vomiting.  She felt bloated and a lot of gas.  She had some symptoms of constipation with smaller stool size and harder to pass.  No blood in stool.    In the emergency department she had workup which included troponin and EKG CT of abdomen.  She has some questions regarding the results.  Reports she was told that she did not have any abnormalities.  She did notice that there was some colonic diverticulosis on the report and a note of a renal cyst.  She said she called back to the emergency department after she noted the EKG she reports explained could be related to her prior cardiac history.  She was started on Pepcid but patient reports she has not taken it because she has concerns about side effects.  She received a letter from pharmacy or insurance that stated over 65 this medication could cause confusion.  She has been using apple cider vinegar.  She thinks this is somewhat helpful.  She reports she is using magnesium to help with her stools which has been helpful.  Overall her pain has been improved.  Her CT showed cholelithiasis but no cholecystitis.  Patient reports she was aware of the stones and she is not been noting any issues after eating.    Patient has history of UTIs.  She will be out of town and is wondering if she could have a prescription to have on hand in case she has recurrence of symptoms.  She is not currently having any issues.      Vascular Disease Follow-up    How often do you take nitroglycerin? Never  Do you take an aspirin every day? Yes          Objective    /74   Pulse 94   Temp 98  F (36.7  C)  "(Temporal)   Resp 14   Ht 1.62 m (5' 3.78\")   Wt 63.2 kg (139 lb 6.4 oz)   LMP  (LMP Unknown)   SpO2 99%   BMI 24.09 kg/m    Body mass index is 24.09 kg/m .  Physical Exam   GENERAL: alert and no distress  NECK: no adenopathy, no asymmetry, masses, or scars  RESP: lungs clear to auscultation - no rales, rhonchi or wheezes  CV: regular rate and rhythm, normal S1 S2, no S3 or S4, no murmur, click or rub, no peripheral edema  ABDOMEN: Normal bowel sounds.  Soft and flat.  Mild epigastric tenderness.  No hepatosplenomegaly or masses.  No rebound or guarding.  MS: no gross musculoskeletal defects noted, no edema            Signed Electronically by: Brittnee Ayala MD    "

## 2024-02-14 NOTE — LETTER
February 21, 2024      Erinn Robb  [address] [place] [address]        Dear ,    We are writing to inform you of your test results.    Here are my comments about your recent results:     -Kidney function (GFR) is normal.  -Sodium is normal.  -Potassium is normal.  -Calcium is decreased.  ADVISE: getting more calcium in your diet and then rechecking this in 1 month.     Your H pylori test is negative.        For additional lab test information, labtestsonline.org is an excellent reference..    Resulted Orders   Helicobacter pylori Antigen Stool   Result Value Ref Range    Helicobacter pylori Antigen Stool Negative Negative      Comment:      Negative for Helicobacter pylori antigen by enzyme immunoassay. A negative result indicates the absence of H. pylori antigen or that the level of antigen is below the level of detection.       If you have any questions or concerns, please call the clinic at the number listed above.       Sincerely,      Brittnee Ayala MD

## 2024-02-15 ENCOUNTER — TELEPHONE (OUTPATIENT)
Dept: FAMILY MEDICINE | Facility: CLINIC | Age: 74
End: 2024-02-15
Payer: COMMERCIAL

## 2024-02-15 LAB
ANION GAP SERPL CALCULATED.3IONS-SCNC: 12 MMOL/L (ref 7–15)
BUN SERPL-MCNC: 18 MG/DL (ref 8–23)
CALCIUM SERPL-MCNC: 8.7 MG/DL (ref 8.8–10.2)
CHLORIDE SERPL-SCNC: 104 MMOL/L (ref 98–107)
CREAT SERPL-MCNC: 0.8 MG/DL (ref 0.51–0.95)
DEPRECATED HCO3 PLAS-SCNC: 25 MMOL/L (ref 22–29)
EGFRCR SERPLBLD CKD-EPI 2021: 77 ML/MIN/1.73M2
GLUCOSE SERPL-MCNC: 103 MG/DL (ref 70–99)
POTASSIUM SERPL-SCNC: 4.1 MMOL/L (ref 3.4–5.3)
SODIUM SERPL-SCNC: 141 MMOL/L (ref 135–145)

## 2024-02-15 PROCEDURE — 87338 HPYLORI STOOL AG IA: CPT | Performed by: FAMILY MEDICINE

## 2024-02-15 NOTE — TELEPHONE ENCOUNTER
Patient calling regarding labs. RN advised her they are still in process.     Judi Goldstein, PANKAJN, RN

## 2024-02-16 LAB — H PYLORI AG STL QL IA: NEGATIVE

## 2024-03-04 ENCOUNTER — NURSE TRIAGE (OUTPATIENT)
Dept: FAMILY MEDICINE | Facility: CLINIC | Age: 74
End: 2024-03-04

## 2024-03-04 ENCOUNTER — OFFICE VISIT (OUTPATIENT)
Dept: FAMILY MEDICINE | Facility: CLINIC | Age: 74
End: 2024-03-04
Payer: COMMERCIAL

## 2024-03-04 VITALS
OXYGEN SATURATION: 98 % | DIASTOLIC BLOOD PRESSURE: 87 MMHG | SYSTOLIC BLOOD PRESSURE: 156 MMHG | BODY MASS INDEX: 23.39 KG/M2 | WEIGHT: 137 LBS | HEIGHT: 64 IN | TEMPERATURE: 98.4 F | HEART RATE: 91 BPM

## 2024-03-04 DIAGNOSIS — H10.33 ACUTE CONJUNCTIVITIS OF BOTH EYES, UNSPECIFIED ACUTE CONJUNCTIVITIS TYPE: ICD-10-CM

## 2024-03-04 DIAGNOSIS — Z23 NEED FOR SHINGLES VACCINE: ICD-10-CM

## 2024-03-04 DIAGNOSIS — J01.00 ACUTE NON-RECURRENT MAXILLARY SINUSITIS: ICD-10-CM

## 2024-03-04 DIAGNOSIS — Z29.11 NEED FOR VACCINATION AGAINST RESPIRATORY SYNCYTIAL VIRUS: ICD-10-CM

## 2024-03-04 DIAGNOSIS — E78.5 HYPERLIPIDEMIA LDL GOAL <70: Primary | ICD-10-CM

## 2024-03-04 DIAGNOSIS — Z23 NEED FOR TDAP VACCINATION: ICD-10-CM

## 2024-03-04 PROCEDURE — 99213 OFFICE O/P EST LOW 20 MIN: CPT | Performed by: PHYSICIAN ASSISTANT

## 2024-03-04 RX ORDER — AMOXICILLIN 500 MG/1
500 CAPSULE ORAL 3 TIMES DAILY
Qty: 30 CAPSULE | Refills: 0 | Status: SHIPPED | OUTPATIENT
Start: 2024-03-04 | End: 2024-03-14

## 2024-03-04 RX ORDER — POLYMYXIN B SULFATE AND TRIMETHOPRIM 1; 10000 MG/ML; [USP'U]/ML
SOLUTION OPHTHALMIC
Qty: 10 ML | Refills: 0 | Status: SHIPPED | OUTPATIENT
Start: 2024-03-04 | End: 2024-04-30

## 2024-03-04 RX ORDER — RESPIRATORY SYNCYTIAL VIRUS VACCINE 120MCG/0.5
0.5 KIT INTRAMUSCULAR ONCE
Qty: 1 EACH | Refills: 0 | Status: CANCELLED | OUTPATIENT
Start: 2024-03-04 | End: 2024-03-04

## 2024-03-04 ASSESSMENT — PAIN SCALES - GENERAL: PAINLEVEL: MODERATE PAIN (4)

## 2024-03-04 NOTE — PATIENT INSTRUCTIONS
Get a saline nasal spray to use, try a cool mist humidifier     Try any allergy eye drop like zaditor or pataday if the prescription does not improve your symptoms.

## 2024-03-04 NOTE — TELEPHONE ENCOUNTER
Patient calling regarding ongoing sinus pain after having covid. Patient denies any nasal discharge but notes sinus pain/congestion x1 month. Denies any fever.     RN was able to schedule patient for open clinic appointment today with Irene Lara at 2:30.     RN reviewed red flag symptoms with patient and when to see emergency care. Patient agreed and understood.     DOROTHEA Duran, RN     Reason for Disposition   Sinus congestion (pressure, fullness) present > 10 days    Additional Information   Negative: Sounds like a life-threatening emergency to the triager   Negative: Difficulty breathing, and not from stuffy nose (e.g., not relieved by cleaning out the nose)   Negative: SEVERE headache and has fever   Negative: Patient sounds very sick or weak to the triager   Negative: SEVERE sinus pain   Negative: SEVERE headache   Negative: Redness or swelling on the cheek, forehead, or around the eye   Negative: Fever > 103 F (39.4 C)   Negative: Fever > 101 F (38.3 C) and over 60 years of age   Negative: Fever > 100.0 F (37.8 C) and has diabetes mellitus or a weak immune system (e.g., HIV positive, cancer chemotherapy, organ transplant, splenectomy, chronic steroids)   Negative: Fever > 100.0 F (37.8 C) and bedridden (e.g., CVA, chronic illness, recovering from surgery)   Negative: Fever present > 3 days (72 hours)   Negative: Fever returns after gone for over 24 hours and symptoms worse or not improved   Negative: Sinus pain (not just congestion) and fever   Negative: Earache    Protocols used: Sinus Pain or Congestion-A-OH

## 2024-03-04 NOTE — PROGRESS NOTES
"  Assessment & Plan       Acute non-recurrent maxillary sinusitis  She will use nasal saline spray, we will do amoxicillin, she was concerned about the size of Augmentin therefore we went with amoxicillin.  If we do need to upgrade to Augmentin we may need to do liquid  - amoxicillin (AMOXIL) 500 MG capsule; Take 1 capsule (500 mg) by mouth 3 times daily for 10 days    Acute conjunctivitis of both eyes, unspecified acute conjunctivitis type  She will use Polytrim which she has used successfully in the past, if this is not improving I think it is very likely it is allergic, she will use allergic over-the-counter eyedrops as above  - polymixin b-trimethoprim (POLYTRIM) 21706-0.1 UNIT/ML-% ophthalmic solution; INSTILL 1-2 DROP INTO BOTH EYES 4 TIMES A DAY FOR 7 DAYS      This chart documentation was completed in part with Dragon voice recognition software.  Documentation is reviewed after completion, however, some words and grammatical errors may remain.  Irene Lara PA-C          Manny Plasencia is a 74 year old, presenting for the following health issues:  Sinus Problem (Ongoing for 1 month (had Covid in Jan))        3/4/2024     2:12 PM   Additional Questions   Roomed by Pio   Accompanied by self         3/4/2024     2:12 PM   Patient Reported Additional Medications   Patient reports taking the following new medications No     History of Present Illness       Headaches:   Since the patient's last clinic visit, headaches are: no change  The patient is getting headaches:  Came with sinus pressure  She is not able to do normal daily activities when she has a migraine.  The patient is taking the following rescue/relief medications:  Ibuprofen (Advil, Motrin)   Patient states \"I get only a small amount of relief\" from the rescue/relief medications.   The patient is taking the following medications to prevent migraines:  No medications to prevent migraines  In the past 4 weeks, the patient has gone to an Urgent " "Care or Emergency Room 0 times times due to headaches.    Reason for visit:  Sinus eye and headache pressure  Symptom onset:  1-2 weeks agoJuju consumes 0 sweetened beverage(s) daily.She exercises with enough effort to increase her heart rate 20 to 29 minutes per day.  She exercises with enough effort to increase her heart rate 5 days per week.   She is taking medications regularly.     She reports her blood pressure is always elevated at the clinic, it is always very well-controlled when she checks it at home    Concern - Sinus causing pain to eyes  Onset: 1 month, she had COVID in January.  She has had multiple strange symptoms that developed thereafter.  She had an episode of stomach upset.  She has now had a lot of head and face pressure.  Her eyes are red and irritated they can be watery and she does have some matter in them in the morning.  Polytrim works wonderfully for her.  She feels that there is something going on with her eyes.  She has never had allergies and does not believe that she has any allergies currently  Description: Face/eye pressure, headaches  Intensity: moderate  Progression of Symptoms:  worsening, mainly pressure  Accompanying Signs & Symptoms: Face/eye pressure, headaches--headache was more significant over the weekend she was even photophobic.  The majority of her pain is on her face and in her upper teeth bilaterally  Previous history of similar problem: yes  Precipitating factors:        Worsened by: comes and goes  Alleviating factors:        Improved by: N/A  Therapies tried and outcome: Ibuprohm, steam-steam seem to make it worse, oils       Review of systems as above      Objective    BP (!) 156/87 (BP Location: Left arm, Patient Position: Sitting, Cuff Size: Adult Regular)   Pulse 91   Temp 98.4  F (36.9  C) (Temporal)   Ht 1.626 m (5' 4\")   Wt 62.1 kg (137 lb)   LMP  (LMP Unknown)   SpO2 98%   BMI 23.52 kg/m    Body mass index is 23.52 kg/m . Second BP: 156/87  Physical " Exam   GENERAL: alert and no distress  EYES: conjunctiva/corneas- conjunctival injection OU  HENT: normal cephalic/atraumatic, ear canals and TM's normal, nose and mouth without ulcers or lesions, nasal mucosa edematous , oropharynx clear, oral mucous membranes moist, and sinuses: maxillary tenderness on bilaterally  NECK: no adenopathy, no asymmetry, masses, or scars  RESP: lungs clear to auscultation - no rales, rhonchi or wheezes  CV: regular rate and rhythm, normal S1 S2, no S3 or S4, no murmur, click or rub, no peripheral edema  MS: no gross musculoskeletal defects noted, no edema  PSYCH: mentation appears normal, affect normal/bright            Signed Electronically by: Irene Lara PA-C

## 2024-03-06 ENCOUNTER — MYC MEDICAL ADVICE (OUTPATIENT)
Dept: FAMILY MEDICINE | Facility: CLINIC | Age: 74
End: 2024-03-06
Payer: COMMERCIAL

## 2024-03-06 DIAGNOSIS — J01.00 ACUTE NON-RECURRENT MAXILLARY SINUSITIS: Primary | ICD-10-CM

## 2024-03-06 RX ORDER — AMOXICILLIN AND CLAVULANATE POTASSIUM 400; 57 MG/5ML; MG/5ML
875 POWDER, FOR SUSPENSION ORAL 2 TIMES DAILY
Qty: 218.8 ML | Refills: 0 | Status: SHIPPED | OUTPATIENT
Start: 2024-03-06 | End: 2024-03-16

## 2024-03-06 NOTE — TELEPHONE ENCOUNTER
Pt called in asking to speak directly to NP, advised her we could take a message to send. She is really hoping for a call back regarding medication.

## 2024-03-06 NOTE — TELEPHONE ENCOUNTER
"Patient seen 3/4/2024 in the afternoon.   Patient is on day 1.5-2 of amoxicillin, still within 48-72 hour timeframe of initial antibiotic use.  Appropriate to prescribe alternative antibiotic?   Would you like further triaging?    Visit notes from 3/4/24: \"Acute non-recurrent maxillary sinusitis  She will use nasal saline spray, we will do amoxicillin, she was concerned about the size of Augmentin therefore we went with amoxicillin.  If we do need to upgrade to Augmentin we may need to do liquid  - amoxicillin (AMOXIL) 500 MG capsule; Take 1 capsule (500 mg) by mouth 3 times daily for 10 days\"    PANKAJ McmillanN, RN  [facility] ~ Registered Nurse  Clinic Triage ~ [facility]  March 6, 2024    "

## 2024-03-24 ENCOUNTER — MYC MEDICAL ADVICE (OUTPATIENT)
Dept: FAMILY MEDICINE | Facility: CLINIC | Age: 74
End: 2024-03-24
Payer: COMMERCIAL

## 2024-03-24 DIAGNOSIS — J01.01 ACUTE RECURRENT MAXILLARY SINUSITIS: Primary | ICD-10-CM

## 2024-03-27 NOTE — TELEPHONE ENCOUNTER
Please call patient, it is not considered stat.  I am unable to change this.  For insurance purposes, a stat order implies that she may have a life-threatening or potentially life threatening condition.  It is best to wait to go through the process of having her insurance verified before completing the test.  I know she is not feeling well, but thankfully it is not life-threatening  Irene Lara PA-C

## 2024-03-27 NOTE — TELEPHONE ENCOUNTER
Spoke with pt, she is totally ok with waiting and doing the routine appt. She is scheduled for 4/5

## 2024-03-27 NOTE — TELEPHONE ENCOUNTER
Pt calling states that imaging has some appts tomorrow and on Friday but cannot release them unless the order is changed to stat.     Routines must book 7 days or more from order.     Pt is ok waiting if NP would rather not change the order but of course would like results sooner if she can.     Staff to call pt with Gwendolyn answer.

## 2024-04-03 ENCOUNTER — ANCILLARY PROCEDURE (OUTPATIENT)
Dept: CT IMAGING | Facility: CLINIC | Age: 74
End: 2024-04-03
Attending: PHYSICIAN ASSISTANT
Payer: COMMERCIAL

## 2024-04-03 ENCOUNTER — TELEPHONE (OUTPATIENT)
Dept: FAMILY MEDICINE | Facility: CLINIC | Age: 74
End: 2024-04-03

## 2024-04-03 DIAGNOSIS — J01.01 ACUTE RECURRENT MAXILLARY SINUSITIS: ICD-10-CM

## 2024-04-03 PROCEDURE — 70486 CT MAXILLOFACIAL W/O DYE: CPT | Performed by: RADIOLOGY

## 2024-04-03 NOTE — TELEPHONE ENCOUNTER
Erinn called regarding CT results and questions regarding impression from 4/3/24  Impression: Mucosal thickening of the left sphenoid sinus. Otherwise,  other sinuses are clear.      Patient prefers call if able and would not ask any further questions to nurse, wishes to speak to provider. RN stated provider has 1-2 business days to review results from test.       Jo Kwon RN  [facility] - Registered Nurse  Clinic Triage [facility]   April 3, 2024

## 2024-04-20 ENCOUNTER — HEALTH MAINTENANCE LETTER (OUTPATIENT)
Age: 74
End: 2024-04-20

## 2024-04-25 ENCOUNTER — TELEPHONE (OUTPATIENT)
Dept: FAMILY MEDICINE | Facility: CLINIC | Age: 74
End: 2024-04-25
Payer: COMMERCIAL

## 2024-04-25 SDOH — HEALTH STABILITY: PHYSICAL HEALTH: ON AVERAGE, HOW MANY DAYS PER WEEK DO YOU ENGAGE IN MODERATE TO STRENUOUS EXERCISE (LIKE A BRISK WALK)?: 3 DAYS

## 2024-04-25 SDOH — HEALTH STABILITY: PHYSICAL HEALTH: ON AVERAGE, HOW MANY MINUTES DO YOU ENGAGE IN EXERCISE AT THIS LEVEL?: 30 MIN

## 2024-04-25 ASSESSMENT — SOCIAL DETERMINANTS OF HEALTH (SDOH): HOW OFTEN DO YOU GET TOGETHER WITH FRIENDS OR RELATIVES?: THREE TIMES A WEEK

## 2024-04-25 NOTE — TELEPHONE ENCOUNTER
Patient Quality Outreach    Patient is due for the following:   IVD  -  LDL (Fasting) and BP Check  Physical Annual Wellness Visit      Topic Date Due    Diptheria Tetanus Pertussis (DTAP/TDAP/TD) Vaccine (1 - Tdap) Never done    Zoster (Shingles) Vaccine (1 of 2) Never done    Flu Vaccine (1) Never done    COVID-19 Vaccine (7 - 2023-24 season) 09/01/2023       Next Steps:   Patient has upcoming appointment, these items will be addressed at that time.    Type of outreach:    Chart review performed, no outreach needed.      Questions for provider review:    None           Alyx Mejias, CMA

## 2024-04-30 ENCOUNTER — OFFICE VISIT (OUTPATIENT)
Dept: FAMILY MEDICINE | Facility: CLINIC | Age: 74
End: 2024-04-30
Attending: FAMILY MEDICINE
Payer: COMMERCIAL

## 2024-04-30 ENCOUNTER — ANCILLARY PROCEDURE (OUTPATIENT)
Dept: GENERAL RADIOLOGY | Facility: CLINIC | Age: 74
End: 2024-04-30
Attending: FAMILY MEDICINE
Payer: COMMERCIAL

## 2024-04-30 VITALS
HEIGHT: 64 IN | TEMPERATURE: 97.3 F | SYSTOLIC BLOOD PRESSURE: 118 MMHG | HEART RATE: 75 BPM | OXYGEN SATURATION: 100 % | DIASTOLIC BLOOD PRESSURE: 70 MMHG | WEIGHT: 137.7 LBS | BODY MASS INDEX: 23.51 KG/M2 | RESPIRATION RATE: 16 BRPM

## 2024-04-30 DIAGNOSIS — M54.9 UPPER BACK PAIN: ICD-10-CM

## 2024-04-30 DIAGNOSIS — Z00.00 ENCOUNTER FOR MEDICARE ANNUAL WELLNESS EXAM: Primary | ICD-10-CM

## 2024-04-30 DIAGNOSIS — Z86.16 HISTORY OF COVID-19: ICD-10-CM

## 2024-04-30 DIAGNOSIS — Z79.899 ENCOUNTER FOR LONG-TERM (CURRENT) USE OF MEDICATIONS: ICD-10-CM

## 2024-04-30 DIAGNOSIS — R25.2 MUSCLE CRAMPS: ICD-10-CM

## 2024-04-30 DIAGNOSIS — I25.2 HISTORY OF NON-ST ELEVATION MYOCARDIAL INFARCTION (NSTEMI): ICD-10-CM

## 2024-04-30 DIAGNOSIS — E78.5 HYPERLIPIDEMIA LDL GOAL <70: ICD-10-CM

## 2024-04-30 LAB
ALBUMIN SERPL BCG-MCNC: 4.5 G/DL (ref 3.5–5.2)
ALP SERPL-CCNC: 96 U/L (ref 40–150)
ALT SERPL W P-5'-P-CCNC: 33 U/L (ref 0–50)
ANION GAP SERPL CALCULATED.3IONS-SCNC: 11 MMOL/L (ref 7–15)
AST SERPL W P-5'-P-CCNC: 40 U/L (ref 0–45)
BILIRUB SERPL-MCNC: 0.5 MG/DL
BUN SERPL-MCNC: 13.5 MG/DL (ref 8–23)
CALCIUM SERPL-MCNC: 9.4 MG/DL (ref 8.8–10.2)
CHLORIDE SERPL-SCNC: 105 MMOL/L (ref 98–107)
CHOLEST SERPL-MCNC: 149 MG/DL
CK SERPL-CCNC: 106 U/L (ref 26–192)
CREAT SERPL-MCNC: 0.75 MG/DL (ref 0.51–0.95)
CRP SERPL-MCNC: <3 MG/L
DEPRECATED HCO3 PLAS-SCNC: 26 MMOL/L (ref 22–29)
EGFRCR SERPLBLD CKD-EPI 2021: 83 ML/MIN/1.73M2
ERYTHROCYTE [SEDIMENTATION RATE] IN BLOOD BY WESTERGREN METHOD: 9 MM/HR (ref 0–30)
FASTING STATUS PATIENT QL REPORTED: YES
GLUCOSE SERPL-MCNC: 100 MG/DL (ref 70–99)
HDLC SERPL-MCNC: 60 MG/DL
LDLC SERPL CALC-MCNC: 71 MG/DL
NONHDLC SERPL-MCNC: 89 MG/DL
POTASSIUM SERPL-SCNC: 4.8 MMOL/L (ref 3.4–5.3)
PROT SERPL-MCNC: 7.5 G/DL (ref 6.4–8.3)
SODIUM SERPL-SCNC: 142 MMOL/L (ref 135–145)
TRIGL SERPL-MCNC: 92 MG/DL

## 2024-04-30 PROCEDURE — 72070 X-RAY EXAM THORAC SPINE 2VWS: CPT | Mod: TC | Performed by: RADIOLOGY

## 2024-04-30 PROCEDURE — 80053 COMPREHEN METABOLIC PANEL: CPT | Performed by: FAMILY MEDICINE

## 2024-04-30 PROCEDURE — 36415 COLL VENOUS BLD VENIPUNCTURE: CPT | Performed by: FAMILY MEDICINE

## 2024-04-30 PROCEDURE — 80061 LIPID PANEL: CPT | Performed by: FAMILY MEDICINE

## 2024-04-30 PROCEDURE — 99214 OFFICE O/P EST MOD 30 MIN: CPT | Mod: 25 | Performed by: FAMILY MEDICINE

## 2024-04-30 PROCEDURE — 85652 RBC SED RATE AUTOMATED: CPT | Performed by: FAMILY MEDICINE

## 2024-04-30 PROCEDURE — 82550 ASSAY OF CK (CPK): CPT | Performed by: FAMILY MEDICINE

## 2024-04-30 PROCEDURE — G0439 PPPS, SUBSEQ VISIT: HCPCS | Performed by: FAMILY MEDICINE

## 2024-04-30 PROCEDURE — 86140 C-REACTIVE PROTEIN: CPT | Performed by: FAMILY MEDICINE

## 2024-04-30 RX ORDER — ROSUVASTATIN CALCIUM 10 MG/1
10 TABLET, COATED ORAL DAILY
COMMUNITY
Start: 2024-04-16

## 2024-04-30 ASSESSMENT — PAIN SCALES - GENERAL: PAINLEVEL: NO PAIN (0)

## 2024-04-30 NOTE — PATIENT INSTRUCTIONS
Preventive Care Advice   This is general advice given by our system to help you stay healthy. However, your care team may have specific advice just for you. Please talk to your care team about your preventive care needs.  Nutrition  Eat 5 or more servings of fruits and vegetables each day.  Try wheat bread, brown rice and whole grain pasta (instead of white bread, rice, and pasta).  Get enough calcium and vitamin D. Check the label on foods and aim for 100% of the RDA (recommended daily allowance).  Lifestyle  Exercise at least 150 minutes each week   (30 minutes a day, 5 days a week).  Do muscle strengthening activities 2 days a week. These help control your weight and prevent disease.  No smoking.  Wear sunscreen to prevent skin cancer.  Have a dental exam and cleaning every 6 months.  Yearly exams  See your health care team every year to talk about:  Any changes in your health.  Any medicines your care team has prescribed.  Preventive care, family planning, and ways to prevent chronic diseases.  Shots (vaccines)   HPV shots (up to age 26), if you've never had them before.  Hepatitis B shots (up to age 59), if you've never had them before.  COVID-19 shot: Get this shot when it's due.  Flu shot: Get a flu shot every year.  Tetanus shot: Get a tetanus shot every 10 years.  Pneumococcal, hepatitis A, and RSV shots: Ask your care team if you need these based on your risk.  Shingles shot (for age 50 and up).  General health tests  Diabetes screening:  Starting at age 35, Get screened for diabetes at least every 3 years.  If you are younger than age 35, ask your care team if you should be screened for diabetes.  Cholesterol test: At age 39, start having a cholesterol test every 5 years, or more often if advised.  Bone density scan (DEXA): At age 50, ask your care team if you should have this scan for osteoporosis (brittle bones).  Hepatitis C: Get tested at least once in your life.  STIs (sexually transmitted  infections)  Before age 24: Ask your care team if you should be screened for STIs.  After age 24: Get screened for STIs if you're at risk. You are at risk for STIs (including HIV) if:  You are sexually active with more than one person.  You don't use condoms every time.  You or a partner was diagnosed with a sexually transmitted infection.  If you are at risk for HIV, ask about PrEP medicine to prevent HIV.  Get tested for HIV at least once in your life, whether you are at risk for HIV or not.  Cancer screening tests  Cervical cancer screening: If you have a cervix, begin getting regular cervical cancer screening tests at age 21. Most people who have regular screenings with normal results can stop after age 65. Talk about this with your provider.  Breast cancer scan (mammogram): If you've ever had breasts, begin having regular mammograms starting at age 40. This is a scan to check for breast cancer.  Colon cancer screening: It is important to start screening for colon cancer at age 45.  Have a colonoscopy test every 10 years (or more often if you're at risk) Or, ask your provider about stool tests like a FIT test every year or Cologuard test every 3 years.  To learn more about your testing options, visit: [email or web address].  For help making a decision, visit: https://bit.ly/cy28295.  Prostate cancer screening test: If you have a prostate and are age 55 to 69, ask your provider if you would benefit from a yearly prostate cancer screening test.  Lung cancer screening: If you are a current or former smoker age 50 to 80, ask your care team if ongoing lung cancer screenings are right for you.  For informational purposes only. Not to replace the advice of your health care provider. Copyright   2023 [employer]. All rights reserved. Clinically reviewed by the [facility]. [employer] [id number] - REV 01/24.    Hearing Loss: Care Instructions  Overview     Hearing loss is a  sudden or slow decrease in how well you hear. It can range from slight to profound. Permanent hearing loss can occur with aging. It also can happen when you are exposed long-term to loud noise. Examples include listening to loud music, riding motorcycles, or being around other loud machines.  Hearing loss can affect your work and home life. It can make you feel lonely or depressed. You may feel that you have lost your independence. But hearing aids and other devices can help you hear better and feel connected to others.  Follow-up care is a key part of your treatment and safety. Be sure to make and go to all appointments, and call your doctor if you are having problems. It's also a good idea to know your test results and keep a list of the medicines you take.  How can you care for yourself at home?  Avoid loud noises whenever possible. This helps keep your hearing from getting worse.  Always wear hearing protection around loud noises.  Wear a hearing aid as directed.  A professional can help you pick a hearing aid that will work best for you.  You can also get hearing aids over the counter for mild to moderate hearing loss.  Have hearing tests as your doctor suggests. They can show whether your hearing has changed. Your hearing aid may need to be adjusted.  Use other devices as needed. These may include:  Telephone amplifiers and hearing aids that can connect to a television, stereo, radio, or microphone.  Devices that use lights or vibrations. These alert you to the doorbell, a ringing telephone, or a baby monitor.  Television closed-captioning. This shows the words at the bottom of the screen. Most new TVs can do this.  TTY (text telephone). This lets you type messages back and forth on the telephone instead of talking or listening. These devices are also called TDD. When messages are typed on the keyboard, they are sent over the phone line to a receiving TTY. The message is shown on a monitor.  Use text  "messaging, social media, and email if it is hard for you to communicate by telephone.  Try to learn a listening technique called speechreading. It is not lipreading. You pay attention to people's gestures, expressions, posture, and tone of voice. These clues can help you understand what a person is saying. Face the person you are talking to, and have them face you. Make sure the lighting is good. You need to see the other person's face clearly.  Think about counseling if you need help to adjust to your hearing loss.  When should you call for help?  Watch closely for changes in your health, and be sure to contact your doctor if:    You think your hearing is getting worse.     You have new symptoms, such as dizziness or nausea.   Where can you learn more?  Go to https://www.[employer].net/patiented  Enter R798 in the search box to learn more about \"Hearing Loss: Care Instructions.\"  Current as of: September 27, 2023               Content Version: 14.0    8229-8751 [employer].   Care instructions adapted under license by your healthcare professional. If you have questions about a medical condition or this instruction, always ask your healthcare professional. [employer] disclaims any warranty or liability for your use of this information.      Learning About Stress  What is stress?     Stress is your body's response to a hard situation. Your body can have a physical, emotional, or mental response. Stress is a fact of life for most people, and it affects everyone differently. What causes stress for you may not be stressful for someone else.  A lot of things can cause stress. You may feel stress when you go on a job interview, take a test, or run a race. This kind of short-term stress is normal and even useful. It can help you if you need to work hard or react quickly. For example, stress can help you finish an important job on time.  Long-term stress is caused by ongoing stressful situations " or events. Examples of long-term stress include long-term health problems, ongoing problems at work, or conflicts in your family. Long-term stress can harm your health.  How does stress affect your health?  When you are stressed, your body responds as though you are in danger. It makes hormones that speed up your heart, make you breathe faster, and give you a burst of energy. This is called the fight-or-flight stress response. If the stress is over quickly, your body goes back to normal and no harm is done.  But if stress happens too often or lasts too long, it can have bad effects. Long-term stress can make you more likely to get sick, and it can make symptoms of some diseases worse. If you tense up when you are stressed, you may develop neck, shoulder, or low back pain. Stress is linked to high blood pressure and heart disease.  Stress also harms your emotional health. It can make you mattson, tense, or depressed. Your relationships may suffer, and you may not do well at work or school.  What can you do to manage stress?  You can try these things to help manage stress:   Do something active. Exercise or activity can help reduce stress. Walking is a great way to get started. Even everyday activities such as housecleaning or yard work can help.  Try yoga or kenny chi. These techniques combine exercise and meditation. You may need some training at first to learn them.  Do something you enjoy. For example, listen to music or go to a movie. Practice your hobby or do volunteer work.  Meditate. This can help you relax, because you are not worrying about what happened before or what may happen in the future.  Do guided imagery. Imagine yourself in any setting that helps you feel calm. You can use online videos, books, or a teacher to guide you.  Do breathing exercises. For example:  From a standing position, bend forward from the waist with your knees slightly bent. Let your arms dangle close to the floor.  Breathe in slowly  "and deeply as you return to a standing position. Roll up slowly and lift your head last.  Hold your breath for just a few seconds in the standing position.  Breathe out slowly and bend forward from the waist.  Let your feelings out. Talk, laugh, cry, and express anger when you need to. Talking with supportive friends or family, a counselor, or a eryn leader about your feelings is a healthy way to relieve stress. Avoid discussing your feelings with people who make you feel worse.  Write. It may help to write about things that are bothering you. This helps you find out how much stress you feel and what is causing it. When you know this, you can find better ways to cope.  What can you do to prevent stress?  You might try some of these things to help prevent stress:  Manage your time. This helps you find time to do the things you want and need to do.  Get enough sleep. Your body recovers from the stresses of the day while you are sleeping.  Get support. Your family, friends, and community can make a difference in how you experience stress.  Limit your news feed. Avoid or limit time on social media or news that may make you feel stressed.  Do something active. Exercise or activity can help reduce stress. Walking is a great way to get started.  Where can you learn more?  Go to https://www.Evernote.net/patiented  Enter N032 in the search box to learn more about \"Learning About Stress.\"  Current as of: October 24, 2023               Content Version: 14.0    4453-3183 [employer].   Care instructions adapted under license by your healthcare professional. If you have questions about a medical condition or this instruction, always ask your healthcare professional. [employer] disclaims any warranty or liability for your use of this information.      Learning About Sleeping Well  What does sleeping well mean?     Sleeping well means getting enough sleep to feel good and stay healthy. How much sleep " is enough varies among people.  The number of hours you sleep and how you feel when you wake up are both important. If you do not feel refreshed, you probably need more sleep. Another sign of not getting enough sleep is feeling tired during the day.  Experts recommend that adults get at least 7 or more hours of sleep per day. Children and older adults need more sleep.  Why is getting enough sleep important?  Getting enough quality sleep is a basic part of good health. When your sleep suffers, your physical health, mood, and your thoughts can suffer too. You may find yourself feeling more grumpy or stressed. Not getting enough sleep also can lead to serious problems, including injury, accidents, anxiety, and depression.  What might cause poor sleeping?  Many things can cause sleep problems, including:  Changes to your sleep schedule.  Stress. Stress can be caused by fear about a single event, such as giving a speech. Or you may have ongoing stress, such as worry about work or school.  Depression, anxiety, and other mental or emotional conditions.  Changes in your sleep habits or surroundings. This includes changes that happen where you sleep, such as noise, light, or sleeping in a different bed. It also includes changes in your sleep pattern, such as having jet lag or working a late shift.  Health problems, such as pain, breathing problems, and restless legs syndrome.  Lack of regular exercise.  Using alcohol, nicotine, or caffeine before bed.  How can you help yourself?  Here are some tips that may help you sleep more soundly and wake up feeling more refreshed.  Your sleeping area   Use your bedroom only for sleeping and sex. A bit of light reading may help you fall asleep. But if it doesn't, do your reading elsewhere in the house. Try not to use your TV, computer, smartphone, or tablet while you are in bed.  Be sure your bed is big enough to stretch out comfortably, especially if you have a sleep partner.  Keep  "your bedroom quiet, dark, and cool. Use curtains, blinds, or a sleep mask to block out light. To block out noise, use earplugs, soothing music, or a \"white noise\" machine.  Your evening and bedtime routine   Create a relaxing bedtime routine. You might want to take a warm shower or bath, or listen to soothing music.  Go to bed at the same time every night. And get up at the same time every morning, even if you feel tired.  What to avoid   Limit caffeine (coffee, tea, caffeinated sodas) during the day, and don't have any for at least 6 hours before bedtime.  Avoid drinking alcohol before bedtime. Alcohol can cause you to wake up more often during the night.  Try not to smoke or use tobacco, especially in the evening. Nicotine can keep you awake.  Limit naps during the day, especially close to bedtime.  Avoid lying in bed awake for too long. If you can't fall asleep or if you wake up in the middle of the night and can't get back to sleep within about 20 minutes, get out of bed and go to another room until you feel sleepy.  Avoid taking medicine right before bed that may keep you awake or make you feel hyper or energized. Your doctor can tell you if your medicine may do this and if you can take it earlier in the day.  If you can't sleep   Imagine yourself in a peaceful, pleasant scene. Focus on the details and feelings of being in a place that is relaxing.  Get up and do a quiet or boring activity until you feel sleepy.  Avoid drinking any liquids before going to bed to help prevent waking up often to use the bathroom.  Where can you learn more?  Go to https://www.[employer].net/patiented  Enter J942 in the search box to learn more about \"Learning About Sleeping Well.\"  Current as of: July 10, 2023               Content Version: 14.0    7953-0487 [employer].   Care instructions adapted under license by your healthcare professional. If you have questions about a medical condition or this instruction, " always ask your healthcare professional. [employer] disclaims any warranty or liability for your use of this information.

## 2024-04-30 NOTE — PROGRESS NOTES
Preventive Care Visit  [facility] SRINIVAS Ayala MD, Family Medicine  Apr 30, 2024      Assessment & Plan     Encounter for Medicare annual wellness exam  See counseling messages    History of non-ST elevation myocardial infarction (NSTEMI)  Patient with history of MI.  She is under the care of cardiology.  She remains on her aspirin and has changed from atorvastatin to rosuvastatin.  She will continue follow-up with cardiology    Upper back pain  Patient with some upper back pain which started centrally but now seems to be on both sides of her upper back.  Right more than left.  She does not have any chest pain with this or shortness of breath.  No lightheadedness or dizziness.  Not worsening with activity.  Will check x-ray to evaluate further.  Will notify results  - XR Thoracic Spine 2 Views; Future    Muscle cramps  Patient having muscle cramps in her lower extremities.  She has had difficulty with finding ways to relieve this.  Her statin was changed because of the muscle cramps and its only been about 1 to 2 weeks since that change.  She has not noticed any improvement yet.  She is concerned that the muscle cramping is related to inflammation from recent COVID.  She is interested in inflammatory marker testing.  This seems reasonable and we will notify results.  We did discuss that these are very specific tests to tell me exactly what is causing inflammation but we will look at the results and reach out to her with those.  Will also check a CK.  Will notify of results.  - CRP, inflammation; Future  - ESR: Erythrocyte sedimentation rate; Future  - CK total; Future  - CRP, inflammation  - ESR: Erythrocyte sedimentation rate  - CK total    History of COVID-19  History of recent COVID.  Patient has noticed some issues with sinus and other pains since that time.  See plans for these issues as noted.  Sinus issues have resolved.    Hyperlipidemia LDL goal <70  Will obtain baseline lipid  panel as she just changed her statin and can recheck and again in 6 to 8 weeks from this time.  - Lipid panel reflex to direct LDL Fasting; Future  - Lipid panel reflex to direct LDL Fasting    Encounter for long-term (current) use of medications  Will notify results  - Comprehensive metabolic panel (BMP + Alb, Alk Phos, ALT, AST, Total. Bili, TP); Future  - Comprehensive metabolic panel (BMP + Alb, Alk Phos, ALT, AST, Total. Bili, TP)              Counseling  Appropriate preventive services were discussed with this patient, including applicable screening as appropriate for fall prevention, nutrition, physical activity, Tobacco-use cessation, weight loss and cognition.  Checklist reviewing preventive services available has been given to the patient.  Reviewed patient's diet, addressing concerns and/or questions.   She is at risk for lack of exercise and has been provided with information to increase physical activity for the benefit of her well-being.   Discussed possible causes of fatigue. The patient was provided with written information regarding signs of hearing loss.           Manny Plasencia is a 74 year old, presenting for the following:  Physical        4/30/2024     9:36 AM   Additional Questions   Roomed by Bernadette HESTER   Accompanied by None         4/30/2024     9:36 AM   Patient Reported Additional Medications   Patient reports taking the following new medications Rosuvastatin 10 mg          Health Care Directive  Patient does not have a Health Care Directive or Living Will: Discussed advance care planning with patient; information given to patient to review.    HPI      COVID  in January. Initially did well when that resolved.  She developed sinus issues treated with antibiotics two times. Had sciatic issues. Upper back pain. 'COVID just caused inflammation all over my body'. Feels like she has had problem after problem.     Saw cardiology and changed from atorvastatin to rosuvastatin. Because of body  aches. Fuzzy brain is better in the last week.     Upper back pain. Almost constant. For a few months. No worsening with activity. No shortness of breath or chest pain. Changes statin due to muscle pain.     Wants to check cholesterol. Wants to check inflammatory tests - CRP and ESR.                   4/25/2024   General Health   How would you rate your overall physical health? Good   Feel stress (tense, anxious, or unable to sleep) To some extent   (!) STRESS CONCERN      4/25/2024   Nutrition   Diet: Low fat/cholesterol         4/25/2024   Exercise   Days per week of moderate/strenous exercise 3 days   Average minutes spent exercising at this level 30 min         4/25/2024   Social Factors   Frequency of gathering with friends or relatives Three times a week   Worry food won't last until get money to buy more No   Food not last or not have enough money for food? No   Do you have housing?  Yes   Are you worried about losing your housing? No   Lack of transportation? No   Unable to get utilities (heat,electricity)? No         4/25/2024   Fall Risk   Fallen 2 or more times in the past year? No   Trouble with walking or balance? No          4/25/2024   Activities of Daily Living- Home Safety   Needs help with the following daily activites None of the above   Safety concerns in the home None of the above         4/25/2024   Dental   Dentist two times every year? Yes         4/25/2024   Hearing Screening   Hearing concerns? (!) I NEED TO ASK PEOPLE TO SPEAK UP OR REPEAT THEMSELVES.    (!) TROUBLE UNDERSTANDING SOFT OR WHISPERED SPEECH.         4/25/2024   Driving Risk Screening   Patient/family members have concerns about driving No         4/25/2024   General Alertness/Fatigue Screening   Have you been more tired than usual lately? (!) YES         4/25/2024   Urinary Incontinence Screening   Bothered by leaking urine in past 6 months No         4/25/2024   TB Screening   Were you born outside of the [place]? No          Today's PHQ-2 Score:       4/29/2024     8:40 AM   PHQ-2 ( 1999 [employer])   Q1: Little interest or pleasure in doing things 0   Q2: Feeling down, depressed or hopeless 0   PHQ-2 Score 0   Q1: Little interest or pleasure in doing things Not at all   Q2: Feeling down, depressed or hopeless Not at all   PHQ-2 Score 0           4/25/2024   Substance Use   Alcohol more than 3/day or more than 7/wk Not Applicable   Do you have a current opioid prescription? No   How severe/bad is pain from 1 to 10? 2/10   Do you use any other substances recreationally? No     Social History     Tobacco Use    Smoking status: Never     Passive exposure: Never    Smokeless tobacco: Never   Vaping Use    Vaping status: Never Used   Substance Use Topics    Alcohol use: No    Drug use: No          Mammogram Screening - Mammogram every 1-2 years updated in Health Maintenance based on mutual decision making    ASCVD Risk   The ASCVD Risk score (Audrey GARIBAY, et al., 2019) failed to calculate for the following reasons:    The patient has a prior MI or stroke diagnosis            Reviewed and updated as needed this visit by Provider   Tobacco  Allergies  Meds  Problems  Med Hx  Surg Hx  Fam Hx            BP Readings from Last 3 Encounters:   04/30/24 118/70   03/04/24 (!) 156/87   02/14/24 132/74    Wt Readings from Last 3 Encounters:   04/30/24 62.5 kg (137 lb 11.2 oz)   03/04/24 62.1 kg (137 lb)   02/14/24 63.2 kg (139 lb 6.4 oz)                  Current providers sharing in care for this patient include:  Patient Care Team:  Brittnee Ayala MD as PCP - General  Brittnee Ayala MD as Assigned PCP    The following health maintenance items are reviewed in Epic and correct as of today:  Health Maintenance   Topic Date Due    DTAP/TDAP/TD IMMUNIZATION (1 - Tdap) Never done    ZOSTER IMMUNIZATION (1 of 2) Never done    RSV VACCINE (Pregnancy & 60+) (1 - 1-dose 60+ series) Never done    DEXA  11/06/2021    INFLUENZA VACCINE  "(1) Never done    COVID-19 Vaccine (7 - 2023-24 season) 09/01/2023    LIPID  02/14/2024    ANNUAL REVIEW OF HM ORDERS  02/14/2024    MAMMO SCREENING  03/09/2025    MEDICARE ANNUAL WELLNESS VISIT  04/30/2025    FALL RISK ASSESSMENT  04/30/2025    GLUCOSE  02/14/2027    ADVANCE CARE PLANNING  02/14/2028    COLORECTAL CANCER SCREENING  03/07/2033    HEPATITIS C SCREENING  Completed    PHQ-2 (once per calendar year)  Completed    Pneumococcal Vaccine: 65+ Years  Completed    IPV IMMUNIZATION  Aged Out    HPV IMMUNIZATION  Aged Out    MENINGITIS IMMUNIZATION  Aged Out    RSV MONOCLONAL ANTIBODY  Aged Out            Objective    Exam  /70   Pulse 75   Temp 97.3  F (36.3  C) (Temporal)   Resp 16   Ht 1.63 m (5' 4.17\")   Wt 62.5 kg (137 lb 11.2 oz)   LMP  (LMP Unknown)   SpO2 100%   BMI 23.51 kg/m     Estimated body mass index is 23.51 kg/m  as calculated from the following:    Height as of this encounter: 1.63 m (5' 4.17\").    Weight as of this encounter: 62.5 kg (137 lb 11.2 oz).    Physical Exam  GENERAL: alert and no distress  EYES: Eyes grossly normal to inspection, PERRL and conjunctivae and sclerae normal  HENT: ear canals and TM's normal, nose and mouth without ulcers or lesions  NECK: no adenopathy, no asymmetry, masses, or scars  RESP: lungs clear to auscultation - no rales, rhonchi or wheezes  CV: regular rate and rhythm, normal S1 S2, no S3 or S4, no murmur, click or rub, no peripheral edema  ABDOMEN: soft, nontender, no hepatosplenomegaly, no masses and bowel sounds normal  BACK: Thoracic area-patient has some tenderness paraspinal bilaterally.  Increased also on the left upper trapezial area.  Minimal over thoracic spine  MS: No lower extremity edema  SKIN: no suspicious lesions or rashes  NEURO: Normal strength and tone, mentation intact and speech normal  PSYCH: mentation appears normal, affect normal/bright           4/30/2024   Mini Cog   Clock Draw Score 2 Normal   3 Item Recall 3 objects " recalled   Mini Cog Total Score 5              Signed Electronically by: Brittnee Ayala MD

## 2024-06-03 ENCOUNTER — DOCUMENTATION ONLY (OUTPATIENT)
Dept: LAB | Facility: CLINIC | Age: 74
End: 2024-06-03
Payer: COMMERCIAL

## 2024-06-03 ENCOUNTER — MYC MEDICAL ADVICE (OUTPATIENT)
Dept: FAMILY MEDICINE | Facility: CLINIC | Age: 74
End: 2024-06-03
Payer: COMMERCIAL

## 2024-06-03 DIAGNOSIS — E78.5 HYPERLIPIDEMIA LDL GOAL <70: Primary | ICD-10-CM

## 2024-06-03 DIAGNOSIS — E55.9 VITAMIN D DEFICIENCY: ICD-10-CM

## 2024-06-03 DIAGNOSIS — Z13.29 SCREENING FOR THYROID DISORDER: Primary | ICD-10-CM

## 2024-06-03 DIAGNOSIS — Z13.21 ENCOUNTER FOR VITAMIN DEFICIENCY SCREENING: ICD-10-CM

## 2024-06-03 NOTE — PROGRESS NOTES
Patient has a lab only scheduled for 6/10 without any lab orders.     Patient had fasting Lipid collected 4/30/24 if you would another lipid panel please place lab order(s).     Thanks!    Isabel PEPPER)

## 2024-06-10 ENCOUNTER — LAB (OUTPATIENT)
Dept: LAB | Facility: CLINIC | Age: 74
End: 2024-06-10
Payer: COMMERCIAL

## 2024-06-10 ENCOUNTER — MYC MEDICAL ADVICE (OUTPATIENT)
Dept: FAMILY MEDICINE | Facility: CLINIC | Age: 74
End: 2024-06-10

## 2024-06-10 DIAGNOSIS — Z00.00 ENCOUNTER FOR MEDICARE ANNUAL WELLNESS EXAM: Primary | ICD-10-CM

## 2024-06-10 DIAGNOSIS — Z00.00 ENCOUNTER FOR MEDICARE ANNUAL WELLNESS EXAM: ICD-10-CM

## 2024-06-10 DIAGNOSIS — E78.5 HYPERLIPIDEMIA LDL GOAL <70: ICD-10-CM

## 2024-06-10 DIAGNOSIS — Z13.29 SCREENING FOR THYROID DISORDER: ICD-10-CM

## 2024-06-10 DIAGNOSIS — E55.9 VITAMIN D DEFICIENCY: ICD-10-CM

## 2024-06-10 LAB
CHOLEST SERPL-MCNC: 141 MG/DL
FASTING STATUS PATIENT QL REPORTED: YES
HDLC SERPL-MCNC: 56 MG/DL
LDLC SERPL CALC-MCNC: 70 MG/DL
MAGNESIUM SERPL-MCNC: 2.1 MG/DL (ref 1.7–2.3)
NONHDLC SERPL-MCNC: 85 MG/DL
T4 FREE SERPL-MCNC: 1.08 NG/DL (ref 0.9–1.7)
TRIGL SERPL-MCNC: 77 MG/DL
TSH SERPL DL<=0.005 MIU/L-ACNC: 5.41 UIU/ML (ref 0.3–4.2)
VIT B12 SERPL-MCNC: 474 PG/ML (ref 232–1245)
VIT D+METAB SERPL-MCNC: 25 NG/ML (ref 20–50)

## 2024-06-10 PROCEDURE — 83735 ASSAY OF MAGNESIUM: CPT

## 2024-06-10 PROCEDURE — 84443 ASSAY THYROID STIM HORMONE: CPT

## 2024-06-10 PROCEDURE — 82607 VITAMIN B-12: CPT

## 2024-06-10 PROCEDURE — 84439 ASSAY OF FREE THYROXINE: CPT

## 2024-06-10 PROCEDURE — 82306 VITAMIN D 25 HYDROXY: CPT

## 2024-06-10 PROCEDURE — 36415 COLL VENOUS BLD VENIPUNCTURE: CPT

## 2024-06-10 PROCEDURE — 80061 LIPID PANEL: CPT

## 2024-06-10 NOTE — TELEPHONE ENCOUNTER
Patient wanting add on orders for vitamin levels for labs done this morning. Routing to provider    Alyx Mejias CMA ([facility])

## 2024-08-21 ENCOUNTER — MYC MEDICAL ADVICE (OUTPATIENT)
Dept: FAMILY MEDICINE | Facility: CLINIC | Age: 74
End: 2024-08-21
Payer: COMMERCIAL

## 2024-08-21 DIAGNOSIS — E78.5 HYPERLIPIDEMIA LDL GOAL <70: Primary | ICD-10-CM

## 2024-08-21 DIAGNOSIS — E89.0 S/P PARTIAL THYROIDECTOMY: ICD-10-CM

## 2024-08-27 ENCOUNTER — LAB (OUTPATIENT)
Dept: LAB | Facility: CLINIC | Age: 74
End: 2024-08-27
Payer: COMMERCIAL

## 2024-08-27 DIAGNOSIS — E78.5 HYPERLIPIDEMIA LDL GOAL <70: ICD-10-CM

## 2024-08-27 DIAGNOSIS — E89.0 S/P PARTIAL THYROIDECTOMY: ICD-10-CM

## 2024-08-27 LAB
CHOLEST SERPL-MCNC: 138 MG/DL
FASTING STATUS PATIENT QL REPORTED: YES
HDLC SERPL-MCNC: 61 MG/DL
LDLC SERPL CALC-MCNC: 63 MG/DL
NONHDLC SERPL-MCNC: 77 MG/DL
T4 FREE SERPL-MCNC: 1.06 NG/DL (ref 0.9–1.7)
TRIGL SERPL-MCNC: 70 MG/DL
TSH SERPL DL<=0.005 MIU/L-ACNC: 6.27 UIU/ML (ref 0.3–4.2)

## 2024-08-27 PROCEDURE — 80061 LIPID PANEL: CPT

## 2024-08-27 PROCEDURE — 84439 ASSAY OF FREE THYROXINE: CPT

## 2024-08-27 PROCEDURE — 36415 COLL VENOUS BLD VENIPUNCTURE: CPT

## 2024-08-27 PROCEDURE — 84443 ASSAY THYROID STIM HORMONE: CPT

## 2024-08-28 ENCOUNTER — MYC MEDICAL ADVICE (OUTPATIENT)
Dept: FAMILY MEDICINE | Facility: CLINIC | Age: 74
End: 2024-08-28
Payer: COMMERCIAL

## 2025-01-16 ENCOUNTER — TELEPHONE (OUTPATIENT)
Dept: FAMILY MEDICINE | Facility: CLINIC | Age: 75
End: 2025-01-16

## 2025-01-16 ENCOUNTER — VIRTUAL VISIT (OUTPATIENT)
Dept: FAMILY MEDICINE | Facility: CLINIC | Age: 75
End: 2025-01-16
Payer: COMMERCIAL

## 2025-01-16 DIAGNOSIS — J01.00 ACUTE MAXILLARY SINUSITIS, RECURRENCE NOT SPECIFIED: Primary | ICD-10-CM

## 2025-01-16 RX ORDER — AMOXICILLIN AND CLAVULANATE POTASSIUM 400; 57 MG/5ML; MG/5ML
500 POWDER, FOR SUSPENSION ORAL 3 TIMES DAILY
Qty: 131.25 ML | Refills: 0 | Status: SHIPPED | OUTPATIENT
Start: 2025-01-16 | End: 2025-01-23

## 2025-01-16 RX ORDER — AMOXICILLIN AND CLAVULANATE POTASSIUM 400; 57 MG/1; MG/1
1 TABLET, CHEWABLE ORAL 3 TIMES DAILY
Qty: 21 TABLET | Refills: 0 | Status: SHIPPED | OUTPATIENT
Start: 2025-01-16 | End: 2025-01-16

## 2025-01-16 NOTE — TELEPHONE ENCOUNTER
Spoke with patient and pharmacy. Pharmacy found patient coupon for suspension so would like   changed to Augmentin suspension please.    Thank you.      Jo Kwon RN  [facility] - Registered Nurse  Clinic Triage [place]   January 16, 2025

## 2025-01-16 NOTE — TELEPHONE ENCOUNTER
Pharmacy calling. Patient is able to crush amoxicillin tablets they have. Requesting order for amoxicillin tablets be sent for patient to . They do not have the chewable tablets but do have the normal tablets that can be crushed.    If needing to speak with pharmacy staff [phone] can click provider line to be directly transferred.    Tamiko Sigala RN on 1/16/2025 at 12:04 PM

## 2025-01-16 NOTE — TELEPHONE ENCOUNTER
Requesting a prescription change as patient is unable to swallow pills. Don't have the chewable tabs of current RX    Requesting change to:   Amoxicillin tablets that could be crushed or capsules     Chewables NOT in stock and are out of patient price range.     Piedad Zavaleta, RN, BSN

## 2025-01-16 NOTE — TELEPHONE ENCOUNTER
Spoke with pt again, she states the internet says not to crush as they are extended release pills so she now wants to try the erika dosing suspension again.     She is VERY apologetic about all this

## 2025-01-16 NOTE — TELEPHONE ENCOUNTER
Pt calling back. She checked with pharmacy and the regular adult dose could be crushed so she would just like to do that.     She apologizes for all the messages and run around

## 2025-01-16 NOTE — TELEPHONE ENCOUNTER
PROVIDER PLEASE CHANGE ANTIBIOTIC BACK TO SUSPENSION, PATIENT CHANGED HER MIND ON ROUTE. THANK YOU        Called pharmacy and relayed message from patient about dose change to oral pills.     Okayed and pharmacy to fill.    Called patient and relayed message and saw patient already called back and now would like suspension as she states [place] said she is unable to crush tablets.     Verified with patient that she is sure she wants the liquid and she stated yes.     Called Pharmacy and relayed information that patient requesting suspension and stop fill of tablets. Sending to Provider for change in RX and will send to Pharmacy.    Jo Kwon RN  [facility] - Registered Nurse  Clinic Triage [place]   January 16, 2025

## 2025-01-16 NOTE — PROGRESS NOTES
Erinn is a 74 year old who is being evaluated via a billable telephone visit.    What phone number would you like to be contacted at? [phone]   How would you like to obtain your AVS? Juanhart  Originating Location (pt. Location): Home    Distant Location (provider location):  Off-site  Telephone visit completed due to the patient did not consent to a video visit.    Assessment & Plan     Acute maxillary sinusitis, recurrence not specified  Symptomatic therapy suggested: push fluids, rest, gargle warm salt water, use vaporizer or mist needed , use acetaminophen, ibuprofen, antihistamine-decongestant of choice as needed, and Return office visit if symptoms persist or worsen.   - amoxicillin-clavulanate (AUGMENTIN) 400-57 MG chewable tablet; Take 1 tablet by mouth 3 times daily for 7 days. ( Patient requested capsule, suspension or chewable and declined tablet)          Subjective   Erinn is a 74 year old, presenting for the following health issues:  Sinus Problem      1/16/2025     8:12 AM   Additional Questions   Roomed by Bernadette HESTER   Accompanied by Lia         1/16/2025     8:12 AM   Patient Reported Additional Medications   Patient reports taking the following new medications NA       Acute Illness  Acute illness concerns: Bilateral ear fullness, sinus pressure.  Onset/Duration: 2 weeks ago  Symptoms:  Fever: No  Chills/Sweats: No  Headache (location?): No  Sinus Pressure: YES  Conjunctivitis:  No  Ear Pain: YES: both  Rhinorrhea: No  Congestion: No  Sore Throat: No  Cough: no  Wheeze: No  Decreased Appetite: No  Nausea: No  Vomiting: No  Diarrhea: No  Dysuria/Freq.: No  Dysuria or Hematuria: No  Fatigue/Achiness: No  Sick/Strep Exposure: No  Therapies tried and outcome: Steam, Ibuprofen, Asprin, Nasal spray           Review of Systems  Constitutional, HEENT, cardiovascular, pulmonary, GI, , musculoskeletal, neuro, skin, endocrine and psych systems are negative, except as otherwise noted.      Objective            Vitals:  No vitals were obtained today due to virtual visit.    Physical Exam   General: Alert and no distress //Respiratory: No audible wheeze, cough, or shortness of breath // Psychiatric:  Appropriate affect, tone, and pace of words        Phone call duration: 21 minutes  Signed Electronically by: Veronique Lewis MD    Answers submitted by the patient for this visit:  General Questionnaire (Submitted on 1/16/2025)  Chief Complaint: Chronic problems general questions HPI Form  What is the reason for your visit today? : SInus and Ear pressure  How many servings of fruits and vegetables do you eat daily?: 0-1  On average, how many sweetened beverages do you drink each day (Examples: soda, juice, sweet tea, etc.  Do NOT count diet or artificially sweetened beverages)?: 0  How many minutes a day do you exercise enough to make your heart beat faster?: 10 to 19  How many days a week do you exercise enough to make your heart beat faster?: 5  How many days per week do you miss taking your medication?: 0  Questionnaire about: Chronic problems general questions HPI Form (Submitted on 1/16/2025)  Chief Complaint: Chronic problems general questions HPI Form

## 2025-01-16 NOTE — TELEPHONE ENCOUNTER
Called patient and she is going to try and crush Augmentin and if at Pharmacy advises against she will call for suspension.     Jo Kwon RN  [facility] - Registered Nurse  Clinic Triage [place]   January 16, 2025

## 2025-02-10 ENCOUNTER — OFFICE VISIT (OUTPATIENT)
Dept: FAMILY MEDICINE | Facility: CLINIC | Age: 75
End: 2025-02-10
Payer: COMMERCIAL

## 2025-02-10 VITALS
TEMPERATURE: 97 F | WEIGHT: 143.1 LBS | RESPIRATION RATE: 17 BRPM | SYSTOLIC BLOOD PRESSURE: 154 MMHG | HEART RATE: 88 BPM | DIASTOLIC BLOOD PRESSURE: 72 MMHG | HEIGHT: 64 IN | BODY MASS INDEX: 24.43 KG/M2 | OXYGEN SATURATION: 99 %

## 2025-02-10 DIAGNOSIS — J01.01 ACUTE RECURRENT MAXILLARY SINUSITIS: Primary | ICD-10-CM

## 2025-02-10 DIAGNOSIS — E78.5 HYPERLIPIDEMIA LDL GOAL <70: ICD-10-CM

## 2025-02-10 DIAGNOSIS — R03.0 ELEVATED BLOOD PRESSURE READING WITHOUT DIAGNOSIS OF HYPERTENSION: ICD-10-CM

## 2025-02-10 PROBLEM — I10 HYPERTENSION: Status: ACTIVE | Noted: 2019-11-28

## 2025-02-10 PROBLEM — I21.4 NON-ST ELEVATION (NSTEMI) MYOCARDIAL INFARCTION (H): Status: ACTIVE | Noted: 2019-11-08

## 2025-02-10 PROBLEM — M81.0 OSTEOPOROSIS: Status: ACTIVE | Noted: 2025-02-10

## 2025-02-10 PROBLEM — Z90.49: Status: ACTIVE | Noted: 2025-01-31

## 2025-02-10 PROCEDURE — G2211 COMPLEX E/M VISIT ADD ON: HCPCS

## 2025-02-10 PROCEDURE — 99215 OFFICE O/P EST HI 40 MIN: CPT

## 2025-02-10 RX ORDER — LEVOFLOXACIN 25 MG/ML
500 SOLUTION ORAL DAILY
Qty: 140 ML | Refills: 0 | Status: SHIPPED | OUTPATIENT
Start: 2025-02-10 | End: 2025-02-17

## 2025-02-10 RX ORDER — AMOXICILLIN 500 MG/1
500 CAPSULE ORAL 3 TIMES DAILY
COMMUNITY
Start: 2025-02-05 | End: 2025-02-10 | Stop reason: ALTCHOICE

## 2025-02-10 ASSESSMENT — PAIN SCALES - GENERAL: PAINLEVEL_OUTOF10: NO PAIN (0)

## 2025-02-10 NOTE — PATIENT INSTRUCTIONS
"I do suspect you have a lingering sinus infection that has not been adequately treated with the Amoxicillin or Augmentin. Please stop the amoxicillin. I have prescribed an antibiotic called Levaquin. Please take as prescribed until course is complete. Take with food to prevent upset stomach. I would also recommend eating yogurt/cottage cheese to replenish good gut bacteria. I have also ordered a repeat CT scan to be completed of your sinuses. Insurance takes 7 days to approve. Please call [phone] to schedule your imaging study.     Symptom Treatment:  - Rest and Hydration: Make sure to get plenty of rest to help your body fight off the infection.  Drink lots of fluids like water, herbal tea, and clear broth to stay hydrated.    - Pain Relief: Over-the-counter pain relievers such as acetaminophen (Tylenol) or ibuprofen (Advil, Motrin) can help alleviate headache or facial pain associated with sinusitis.  You can take 1000 mg of tylenol up to three times daily, as long as another provider has not restricted you from taking this type of medication.      - Warm Compresses: Apply warm compresses to your face to help ease sinus pain and pressure.  Simply soak a clean towel in warm water, wring out the excess water, and place it over your face for a few minutes at a time.    Infection Treatment:  - Nasal Saline Irrigation: Use a saline nasal rinse, or nasal spray to help clear out mucus and soothe your nasal passages.  Follow the instructions provided with the saline rinse or spray for proper use. Make sure to use distilled water from the store, not tap water.            - Nasal Corticosteroids: I recommend using a nasal corticosteroid spray, such as Flonase or Nasacort, to reduce inflammation in your nasal passages and improve breathing and drainage.  Use the nasal spray once daily (1-2 sprays into each nostril).   It is best to do a saline rinse just prior to using the nasal spray.  Shake and \"prime\" the bottle " first making sure a nice spray comes out prior to use.  Aim back into the sinuses, not just straight up your nose. Also aim a little away from the center (septum) of your nose.   Breath in slightly (but not excessively) with each spray. When completed breath out through the mouth  Repeat on the other side.  Wipe of the nozzle with a clean tissue when complete and cover with the manufactures cap.    Store in a room temperature area out of the light.  Don't share sprays with friends and family.    A side effect of most nasal sprays includes nose bleeds.  Be patient as it may take a few days to start working.

## 2025-02-10 NOTE — PROGRESS NOTES
Assessment & Plan     Acute recurrent maxillary sinusitis  Patient is a 75 year-old with hyperlipidemia presenting with concerns of ongoing sinus symptoms despite being prescribed Augmentin and Amoxicillin (see HPI). Experiencing angioedema with doxycyline. Plan to increase coverage, prescribed 7 day course of Levaquin. Not taking oral steroids, discussed risk with concomitant use. Continue with supportive/symptom management. Plan to update sinus CT scan. Follow-up if symptoms fail to improve despite above interventions. Patient understands and is agreeable to plan as discussed in clinic.  - CT Sinus w/o Contrast; Future  - Adult ENT  Referral; Future  - levofloxacin (LEVAQUIN) 25 MG/ML solution; Take 20 mLs (500 mg) by mouth daily for 7 days.    Hyperlipidemia LDL goal <70  Recommended trial of azithromycin for atypical coverage. Patient does not want to take azithromycin as her  developed rhabdomyolysis while taking secondary to statin use.     Elevated blood pressure reading without diagnosis of hypertension  States that this always occurs when in clinic. Forgot to recheck prior to leaving.       I spent a total of 42 minutes on the day of the visit.   Time spent by me today doing chart review, history and exam, documentation and further activities per the note    Subjective   Erinn is a 75 year old, presenting for the following health issues:  Sinus Problem        2/10/2025     7:45 AM   Additional Questions   Roomed by Bernadette HESTER   Accompanied by None         2/10/2025     7:45 AM   Patient Reported Additional Medications   Patient reports taking the following new medications NA     History of Present Illness       Reason for visit:  Continuing sinus problem with neck pain    She eats 0-1 servings of fruits and vegetables daily.She consumes 0 sweetened beverage(s) daily.She exercises with enough effort to increase her heart rate 10 to 19 minutes per day.  She exercises with enough effort to  "increase her heart rate 3 or less days per week.   She is taking medications regularly.     Acute Illness  Acute illness concerns: sinus infection   Onset/Duration: 1 month ago   Symptoms:  Fever: No  Chills/Sweats: YES  Headache (location?): No  Sinus Pressure: YES  Conjunctivitis:  No  Ear Pain: YES: bilateral  Rhinorrhea: No  Congestion: No  Sore Throat: No  Cough: no  Wheeze: No  Decreased Appetite: No  Nausea: No  Vomiting: No  Diarrhea: No  Dysuria/Freq.: No  Dysuria or Hematuria: No  Fatigue/Achiness: YES  Sick/Strep Exposure: No  Therapies tried and outcome: increasing fluids     Presents with concerns of ongoing sinus symptoms for the past 3+ weeks. Was seen virtually on 1/16 and diagnosed with acute sinusitis and placed on Augmentin. Completed course of Augmentin without symptoms resolution. Was seen as follow-up with [facility] on 1/31/25 and prescribed doxycycline. Had allergic reaction nearly immediately after taking doxycycline that included tongue and lip swelling. Doxycyline was discontinued and patient prescribed Augmentin, unable to take pills due to prior neck surgery so Augmentin was changed to Amoxicillin. Has not noticed any improvement in symptoms since being on amoxicillin.     Denies preceding URI symptoms. Denies nasal discharge. Associated maxillary sinus pressure, dental pain, and ear congestion. Attempted Flonase for 2-3 days but did not notice improvement while taking. Denies use of sterile saline rinses.       Objective    BP (!) 154/72   Pulse 88   Temp 97  F (36.1  C) (Temporal)   Resp 17   Ht 1.626 m (5' 4.02\")   Wt 64.9 kg (143 lb 1.6 oz)   LMP  (LMP Unknown)   SpO2 99%   BMI 24.55 kg/m    Body mass index is 24.55 kg/m .  Physical Exam  Vitals reviewed.   Constitutional:       General: She is not in acute distress.     Appearance: Normal appearance. She is not ill-appearing.   HENT:      Head: Normocephalic and atraumatic.      Right Ear: Tympanic membrane, ear canal " and external ear normal.      Left Ear: Tympanic membrane, ear canal and external ear normal.      Ears:      Comments: Negative for middle ear effusion, TM injection, bulging, and erythema bilaterally.     Nose: No congestion or rhinorrhea.      Right Sinus: Maxillary sinus tenderness present. No frontal sinus tenderness.      Left Sinus: Maxillary sinus tenderness present. No frontal sinus tenderness.      Mouth/Throat:      Mouth: Mucous membranes are moist. No oral lesions.      Pharynx: Oropharynx is clear. Uvula midline. No oropharyngeal exudate, posterior oropharyngeal erythema or postnasal drip.   Eyes:      Conjunctiva/sclera:      Right eye: Right conjunctiva is not injected. No exudate.     Left eye: Left conjunctiva is not injected. No exudate.     Pupils: Pupils are equal, round, and reactive to light.   Cardiovascular:      Rate and Rhythm: Normal rate and regular rhythm.      Heart sounds: Normal heart sounds, S1 normal and S2 normal. No murmur heard.  Pulmonary:      Effort: Pulmonary effort is normal. No respiratory distress.      Breath sounds: Normal breath sounds. No wheezing.   Lymphadenopathy:      Cervical: No cervical adenopathy.   Skin:     General: Skin is warm and dry.      Capillary Refill: Capillary refill takes less than 2 seconds.      Findings: No lesion or rash.   Neurological:      Mental Status: She is alert.   Psychiatric:         Attention and Perception: Attention and perception normal.         Mood and Affect: Mood and affect normal.        CT pending scheduling.         Signed Electronically by: SUMMER Mckeon CNP

## 2025-02-19 ENCOUNTER — NURSE TRIAGE (OUTPATIENT)
Dept: FAMILY MEDICINE | Facility: CLINIC | Age: 75
End: 2025-02-19

## 2025-02-19 NOTE — TELEPHONE ENCOUNTER
I'm sorry to hear that she is upset.  I do really want her to have a visit and exam prior to ordering any xrays to avoid getting the wrong xrays or imaging .     I would recommend her seeing Ainsley this week to get things moving. I'm sorry I can't accommodate an earlier visit with me.

## 2025-02-19 NOTE — TELEPHONE ENCOUNTER
"Nurse Triage SBAR    Is this a 2nd Level Triage? NO    Situation: Patient calling to get appointment with PCP only. She says she can not wait until next opening on 03/18.     Background: She is having ongoing sinus pressure/fullness and ongoing neck pain. She was told to follow-up with ENT, but they are booking out to June.     Assessment: She has had symptoms ongoing for a while. She saw Tgnomra Jade on 02/10, and was prescribed Levaquin. A few days later she went to [facility] who did an X-ray and found no infection and told her to not take Levaquin. She denies drainage or cough and fever. She says she is having \"dry, fullness, and pressure\". She is also having ongoing neck pain. \"Since I've had it for sore long it is getting more severe\". She does not know if it is muscle or nerve related. She denies numbness/tingling in face/arms.     Protocol Recommended Disposition:   See in Office Within 2 Weeks, Home Care    Recommendation: Patient is requesting to see PCP, has been seen multiple times for current conditions. Please advise.     Hakan De Dios RN on 2/19/2025 at 7:50 AM       Routed to provider    Does the patient meet one of the following criteria for ADS visit consideration? 16+ years old, with an [facility] PCP     TIP  Providers, please consider if this condition is appropriate for management at one of our Acute and Diagnostic Services sites.     If patient is a good candidate, please use dotphrase <dot>triageresponse and select Refer to ADS to document.      Reason for Disposition   Neck pain is a chronic symptom (recurrent or ongoing AND lasting > 4 weeks)   Patient wants to be seen    Additional Information   Negative: MODERATE neck pain (e.g., interferes with normal activities like work or school) and present > 3 days   Negative: Neck pain lasts > 2 weeks   Negative: Pain shoots (radiates) into arm or hand   Negative: Tenderness in front of neck over windpipe   Negative: SEVERE pain (e.g., " excruciating, unable to do any normal activities)   Negative: Head is twisting to one side (or ask 'is it turning against your will?')   Negative: Fever > 103 F (39.4 C)   Negative: Fever > 100 F (37.8 C) and Intravenous Drug Use (IVDU)   Negative: Fever > 100 F (37.8 C) and has diabetes mellitus or a weak immune system (e.g., HIV positive, cancer chemotherapy, organ transplant, splenectomy, chronic steroids)   Negative: Numbness in an arm or hand (i.e., loss of sensation)   Negative: Painful rash with multiple small blisters grouped together (i.e., dermatomal distribution or 'band' or 'stripe')   Negative: High-risk adult (e.g., history of cancer, HIV, or IV Drug Use)   Negative: Patient wants to be seen   Negative: Difficulty breathing or unusual sweating (e.g., sweating without exertion)   Negative: Chest pain lasting longer than 5 minutes   Negative: Stiff neck (can't touch chin to chest) and has headache   Negative: Stiff neck (can't touch chin to chest) and fever   Negative: Weakness of an arm or hand   Negative: Problems with bowel or bladder control   Negative: Patient sounds very sick or weak to the triager   Negative: Followed an injury to neck (e.g., MVA, sports, impact or collision)   Negative: Chest pain   Negative: Lymph node in the neck is swollen or painful to the touch   Negative: Sore throat is main symptom   Negative: Shock suspected (e.g., cold/pale/clammy skin, too weak to stand, low BP, rapid pulse)   Negative: Similar pain previously and it was from 'heart attack'   Negative: Similar pain previously from 'angina' and not relieved by nitroglycerin   Negative: Difficult to awaken or acting confused (e.g., disoriented, slurred speech)   Negative: Sounds like a life-threatening emergency to the triager   Negative: Lots of coughing   Negative: Sinus congestion (pressure, fullness) present > 10 days   Negative: Nasal discharge present > 10 days   Negative: Using nasal washes and pain medicine > 24  hours and sinus pain (lower forehead, cheekbone, or eye) persists   Negative: Fever present > 3 days (72 hours)   Negative: Fever returns after gone for over 24 hours and symptoms worse or not improved   Negative: Sinus pain (not just congestion) and fever   Negative: Earache   Negative: SEVERE headache and has fever   Negative: Patient sounds very sick or weak to the triager   Negative: SEVERE sinus pain (e.g., excruciating)   Negative: Severe headache   Negative: Redness or swelling on the cheek, forehead, or around the eye   Negative: Fever > 103 F (39.4 C)   Negative: Fever > 101 F (38.3 C) and over 60 years of age   Negative: Fever > 100 F (37.8 C) and has diabetes mellitus or a weak immune system (e.g., HIV positive, cancer chemotherapy, organ transplant, splenectomy, chronic steroids)   Negative: Fever > 100 F (37.8 C) and bedridden (e.g., CVA, chronic illness, recovering from surgery)   Negative: Difficulty breathing, and not from stuffy nose (e.g., not relieved by cleaning out the nose)   Negative: Sounds like a life-threatening emergency to the triager    Protocols used: Neck Pain or [id number], Sinus Pain or Congestion-A-OH

## 2025-02-19 NOTE — TELEPHONE ENCOUNTER
RN called patient and relayed message below. Patient opted to keep next week appointment.    Jo Kwon RN  [facility] - Registered Nurse  Clinic Triage Lin   February 19, 2025

## 2025-02-19 NOTE — TELEPHONE ENCOUNTER
"Called patient relayed message. She is upset she has to wait until Tuesday and feels she should have, more xrays done in the mean time. Reviewed with patient that an evaluation is needed to see if further xrays appropriate and what views needed.       Offered multiple other appointments to be seen earlier, declined.     Patient stated she had CT without contrast, 2/13/25, as she has contrast dye allergy.    Requested PCP aware that she \"wants xrays now this week\" Again offered other Provider appt. And she declined.       Jo Kwon RN  [facility] - Registered Nurse  Clinic Triage Lin   February 19, 2025      "

## 2025-02-25 ENCOUNTER — OFFICE VISIT (OUTPATIENT)
Dept: FAMILY MEDICINE | Facility: CLINIC | Age: 75
End: 2025-02-25
Payer: COMMERCIAL

## 2025-02-25 VITALS
WEIGHT: 141.8 LBS | OXYGEN SATURATION: 98 % | RESPIRATION RATE: 12 BRPM | BODY MASS INDEX: 24.33 KG/M2 | DIASTOLIC BLOOD PRESSURE: 80 MMHG | TEMPERATURE: 98.3 F | HEART RATE: 96 BPM | SYSTOLIC BLOOD PRESSURE: 130 MMHG

## 2025-02-25 DIAGNOSIS — R20.2 TINGLING OF SKIN: ICD-10-CM

## 2025-02-25 DIAGNOSIS — H93.8X3 PRESSURE SENSATION IN BOTH EARS: ICD-10-CM

## 2025-02-25 DIAGNOSIS — J34.89 SINUS PAIN: Primary | ICD-10-CM

## 2025-02-25 DIAGNOSIS — M54.2 NECK PAIN: ICD-10-CM

## 2025-02-25 PROCEDURE — 1125F AMNT PAIN NOTED PAIN PRSNT: CPT | Performed by: FAMILY MEDICINE

## 2025-02-25 PROCEDURE — 99215 OFFICE O/P EST HI 40 MIN: CPT | Performed by: FAMILY MEDICINE

## 2025-02-25 PROCEDURE — 3075F SYST BP GE 130 - 139MM HG: CPT | Performed by: FAMILY MEDICINE

## 2025-02-25 PROCEDURE — G2211 COMPLEX E/M VISIT ADD ON: HCPCS | Performed by: FAMILY MEDICINE

## 2025-02-25 PROCEDURE — 36415 COLL VENOUS BLD VENIPUNCTURE: CPT | Performed by: FAMILY MEDICINE

## 2025-02-25 PROCEDURE — 3078F DIAST BP <80 MM HG: CPT | Performed by: FAMILY MEDICINE

## 2025-02-25 PROCEDURE — 86618 LYME DISEASE ANTIBODY: CPT | Performed by: FAMILY MEDICINE

## 2025-02-25 ASSESSMENT — PAIN SCALES - GENERAL: PAINLEVEL_OUTOF10: SEVERE PAIN (7)

## 2025-02-25 NOTE — PROGRESS NOTES
Assessment & Plan     Sinus pain  Pressure sensation in both ears  Patient is reporting sinus pain over the last several weeks.  She has been evaluated on a few occasions in the clinic.  She was given a course of amoxicillin which she did not finish.  She also had Augmentin and then placed on Levaquin.  She reports that she never finished a full course of any of these medications for various reasons.  She had a CT of her sinuses which did not show any signs of sinus infection ongoing.  She reports she feels that her sinuses are very dry and uncomfortable.  She tried Flonase but felt that made things worse.  She has ear pressure currently as well.  On exam she does have some clear effusions behind each tympanic membrane.  She has some tenderness over the sinuses to palpation both maxillary and frontal.  She has tenderness along the muscles of the neck and shoulders.  Discussed options to help with the sinus discomfort but also with apparent eustachian tube dysfunction.  Discussed a trial of prednisone.  She has listed an allergy to methylprednisolone but it states that she did well with prednisone.  She declines to try that because of concerns and how it might make her feel.  Discussed a trial of Rhinocort aqueous instead of the Flonase to see if that is better tolerated.  Discussed that it can take a couple of weeks to resolve symptoms or show good improvement with the ears and the sinuses.  She is going to give this a try.  She will reach out if worsening or concerns in the meantime.  - budesonide (RINOCORT AQUA) 32 MCG/ACT nasal spray; Spray 2 sprays into both nostrils daily.        Tingling of skin  Patient is reporting some tingling of the skin over her scalp.  She has not noticed any weakness or numbness.  She is concerned because she had a tick bite last year and wonders if this could be Lyme.  Did place an order for a Lyme titer to be done and will notify of results.  If possible this tingling is related to  anxiety or concerns that she is experiencing about her other symptoms.  We did discuss her stress level and concerns and she is considering whether counseling might be an option for her.  - LYME DISEASE TOTAL ANTIBODIES WITH REFLEX TO CONFIRMATION; Future  - LYME DISEASE TOTAL ANTIBODIES WITH REFLEX TO CONFIRMATION    Neck pain  Patient is having some neck pain which based on exam seems to be muscular in nature.  Patient would really like to have an x-ray of her neck as she is concerned she has some arthritis.  We discussed that arthritis would be a likely finding for someone who is in their 70s and does not necessarily mean that her pain is related.  Patient states she understands that she is very interested in that x-ray.  She also states her insurance will cover it so she feels that it would be beneficial to at least understand if there is other issues potentially going on.  Plan x-ray orders placed.  We are unable to get the x-ray today because the x-ray machine is down and patient is scheduling to return for that.  - XR Cervical Spine 2/3 Views; Future    The longitudinal plan of care for the diagnosis(es)/condition(s) as documented were addressed during this visit. Due to the added complexity in care, I will continue to support Erinn in the subsequent management and with ongoing continuity of care.    45 minutes spent by me on the date of the encounter doing chart review, review of test results, patient visit, and documentation             Subjective   Erinn is a 75 year old, presenting for the following health issues:  Neck Pain and Follow Up        2/25/2025     3:09 PM   Additional Questions   Roomed by Bernadette HESTER   Accompanied by None         2/25/2025     3:09 PM   Patient Reported Additional Medications   Patient reports taking the following new medications NA         History of Present Illness       Back Pain:  She presents for follow up of back pain. Patient's back pain is a recurring problem.  Location  of back pain:  Left lower back, right middle of back, left middle of back, right upper back, left upper back, right side of neck, left side of neck, right shoulder and left shoulder  Description of back pain: burning, fullness and gnawing  Back pain spreads: nowhere    Since patient first noticed back pain, pain is: always present, but gets better and worse  Does back pain interfere with her job:  Not applicable       Reason for visit:  Not resolved neck pain, sinus, ear and head pressure    She eats 0-1 servings of fruits and vegetables daily.She exercises with enough effort to increase her heart rate 9 or less minutes per day.  She exercises with enough effort to increase her heart rate 3 or less days per week.   She is taking medications regularly.     Mid to end of January had sinus issues. Has been on augmentin, doxycycline and then levaquin. Had reaction to the doxycycline - tongue wouldn't move. Had CT scan of sinuses and no issues noted. No cough, fever or drainage. Feels dry. Sinus pressure. Tingling into head - front and back. 'head feels big'.   History of salivary gland surgery right neck. Pressure issues in her ears. Fullness in her ears. Started with a car ride.     Patient has concerns about how she has been feeling and worried about several things.  She is questioning whether she could have Lyme disease.  She remembers a tick bite from last summer or fall.  She did not remember having any rash or symptoms at the time.  She is concerned that some of the tingling that she is noticing over her scalp might be related to that tick bite.    She also is having a lot of tightness across her neck and up into the back of her skull.  She is concerned about arthritis in her neck.  There is been no injuries.  She reports feeling a lot of stress and pressure because of her 's illness.  She also has been worried about her symptoms and whether something else is going on more systemic.                     Objective    /80   Pulse 96   Temp 98.3  F (36.8  C) (Temporal)   Resp 12   Wt 64.3 kg (141 lb 12.8 oz)   LMP  (LMP Unknown)   SpO2 98%   BMI 24.33 kg/m    Body mass index is 24.33 kg/m .  Physical Exam   GENERAL: alert and no distress  EYES: Eyes grossly normal to inspection, PERRL and conjunctivae and sclerae normal  HENT: normal cephalic/atraumatic, both ears: clear effusion, nose and mouth without ulcers or lesions, oropharynx clear, and oral mucous membranes moist  NECK: no adenopathy, no asymmetry, masses, or scars, thyroid normal to palpation, and she has tenderness over the trapezial ridge bilaterally.  She has tenderness at the back of the skull at insertion points for the muscles from the neck.  RESP: lungs clear to auscultation - no rales, rhonchi or wheezes  CV: regular rate and rhythm, normal S1 S2, no S3 or S4, no murmur, click or rub, no peripheral edema  MS: no gross musculoskeletal defects noted, no edema  PSYCH: Patient seems anxious today.  Her judgment seems intact.  She is well-groomed.            Signed Electronically by: Brittnee Ayala MD

## 2025-02-26 ENCOUNTER — ANCILLARY PROCEDURE (OUTPATIENT)
Dept: GENERAL RADIOLOGY | Facility: OTHER | Age: 75
End: 2025-02-26
Attending: FAMILY MEDICINE
Payer: COMMERCIAL

## 2025-02-26 DIAGNOSIS — M54.2 NECK PAIN: ICD-10-CM

## 2025-02-26 PROCEDURE — 72040 X-RAY EXAM NECK SPINE 2-3 VW: CPT | Mod: TC | Performed by: RADIOLOGY

## 2025-02-27 LAB — B BURGDOR IGG+IGM SER QL: 0.05

## 2025-02-28 ENCOUNTER — MYC MEDICAL ADVICE (OUTPATIENT)
Dept: FAMILY MEDICINE | Facility: CLINIC | Age: 75
End: 2025-02-28
Payer: COMMERCIAL

## 2025-03-02 ENCOUNTER — NURSE TRIAGE (OUTPATIENT)
Dept: FAMILY MEDICINE | Facility: CLINIC | Age: 75
End: 2025-03-02
Payer: COMMERCIAL

## 2025-03-02 DIAGNOSIS — J34.89 SINUS PAIN: Primary | ICD-10-CM

## 2025-03-03 NOTE — TELEPHONE ENCOUNTER
Patient asking about ADS, Spoke with provider at [facility]S directly and he recommended she go to ED for full work up today due to neuro symptoms.    Called patient back and she reports she does not feel this is an emergent situation. She said she feels like this is more her sinuses and that she has a bad sinus infection. She reports she started taking Augmentin this morning and her sinuses are already feeling better. She also has an appointment with an ortho provider tomorrow morning to have her neck looked at. She thinks the headaches were being caused by the nasal spray she was started on so she stopped that and it has improved. She also took some ibuprofen and that has helped relieve some pain.    Patient is requesting a full course of Augmentin if possible. She declines ED at this time.    Michael Ogden RN on 3/3/2025 at 3:09 PM

## 2025-03-03 NOTE — TELEPHONE ENCOUNTER
I've gone ahead and sent the augmentin although there was no evidence on the CT of infection recently.     Reachout if continued issues.

## 2025-03-03 NOTE — TELEPHONE ENCOUNTER
Reviewed. Agree with options of ADS/UC or ED for further evaluation since she has significant change.

## 2025-03-03 NOTE — TELEPHONE ENCOUNTER
"  Nurse Triage SBAR    Is this a 2nd Level Triage? YES, LICENSED PRACTITIONER REVIEW IS REQUIRED    Situation: Patient stating she is having 8/10 increasing neck pain, able to move it. Facial pressure all over head and increasing headaches, eye and ear pressure. Mild lightheadedness tingling all over head and face since 3/1. Tried ice, heat, OTC medications would not specify and nothing has   Background: Saw [facility] 3/2 and set up to see spine MD 3/13. Recently saw PCP 2/25 was told to reach out with worsening symptoms and possible PT.     Assessment: Denies swelling, fever, chills,CP, SOB, dizziness, sore throat, vision changes, numbness, drooling, loss of bowel and bladder, N&V, impaired movement.     At first patient stated she is \"unable to do anything or move\" RN recommended ED or 911. Declined    Patient then stated she can move just more uncomfortable with current symptoms.     Protocol Recommended Disposition:   Go To Office Now    Offered visit and declined  Recommendation: Reviewed with patient to be seen and if worsening or pain increasing to be evaluated in ED/ADS/UC/911. Declined all and requested provider review and respond.    Made patient awareProvider is out of office and covering LP will review. Patient verbalized understanding and all questions answered.     Routed to provider    Does the patient meet one of the following criteria for ADS visit consideration? 16+ years old, with an [facility] PCP     Jo Kwon RN  [facility] - Registered Nurse  Clinic Triage [place]   March 3, 2025      Reason for Disposition   SEVERE pain (e.g., excruciating, unable to do any normal activities)    Additional Information   Negative: Shock suspected (e.g., cold/pale/clammy skin, too weak to stand, low BP, rapid pulse)   Negative: Similar pain previously and it was from 'heart attack'   Negative: Similar pain previously from 'angina' and not relieved by nitroglycerin   Negative: Difficult to awaken or acting confused " "(e.g., disoriented, slurred speech)   Negative: Sounds like a life-threatening emergency to the triager   Negative: Followed an injury to neck (e.g., MVA, sports, impact or collision)   Negative: Chest pain   Negative: Lymph node in the neck is swollen or painful to the touch   Negative: Sore throat is main symptom   Negative: Difficulty breathing or unusual sweating (e.g., sweating without exertion)   Negative: Chest pain lasting longer than 5 minutes   Negative: Stiff neck (can't touch chin to chest) and has headache   Negative: Stiff neck (can't touch chin to chest) and fever   Negative: Weakness of an arm or hand   Negative: Problems with bowel or bladder control   Negative: Patient sounds very sick or weak to the triager    Answer Assessment - Initial Assessment Questions  1. ONSET: \"When did the pain begin?\"       unknown  2. LOCATION: \"Where does it hurt?\"       \"All over neck\"  3. PATTERN \"Does the pain come and go, or has it been constant since it started?\"    Constant 8/10  4. SEVERITY: \"How bad is the pain?\"  (Scale 0-10; or none or slight stiffness, mild, moderate, severe)  severe  5. RADIATION: \"Does the pain go anywhere else, shoot into your arms?\"     no  6. CORD SYMPTOMS: \"Any weakness or numbness of the arms or legs?\"    no  7. CAUSE: \"What do you think is causing the neck pain?\"      unknown  8. NECK OVERUSE: \"Any recent activities that involved turning or twisting the neck?\"      no  9. OTHER SYMPTOMS: \"Do you have any other symptoms?\" (e.g., headache, fever, chest pain, difficulty breathing, neck swelling)    headache  10. PREGNANCY: \"Is there any chance you are pregnant?\" \"When was your last menstrual period?\"        N/a    Protocols used: Neck Pain or [id number]    "

## 2025-03-03 NOTE — TELEPHONE ENCOUNTER
Patient returning call, relayed providers update. Patient would like PCP updated with upcoming appointments.   [place] [facility] appointment tomorrow.   Head/neck exam with [facility] later this week.     Hakan De Dios RN on 3/3/2025 at 3:54 PM

## 2025-03-03 NOTE — TELEPHONE ENCOUNTER
RN called patient and relayed message. Declined UC/ED/ADS     Requested appointment for 3/4.    Jo Kwon RN  [facility] - Registered Nurse  Clinic Triage [place]   March 3, 2025

## 2025-03-03 NOTE — TELEPHONE ENCOUNTER
Huddled with Dr. Cuellar and agreed with RN disposition stating if worsening to be seen sooner for evaluation.     Called patient and she declined visit and will await PCP opinion for next steps. Encouraged if worsening red flags to be evaluated in ED. Patient verbalized understanding and all questions answered.     Jo Kwon RN  [facility] - Registered Nurse  Clinic Triage [place]   March 3, 2025

## 2025-03-03 NOTE — TELEPHONE ENCOUNTER
Patient Contact    Attempt # 1    RN did attempt to reach patient, no answer, left message for her to call back and ask to speak with a nurse. Wanting to relay that prescription has been sent and that patient should reach out for ongoing symptoms, per provider below.     Piedad Zavaleta, RN on 3/3/2025 at 3:46 PM

## 2025-03-06 ENCOUNTER — TRANSFERRED RECORDS (OUTPATIENT)
Dept: HEALTH INFORMATION MANAGEMENT | Facility: CLINIC | Age: 75
End: 2025-03-06
Payer: COMMERCIAL

## 2025-03-14 ENCOUNTER — TELEPHONE (OUTPATIENT)
Dept: FAMILY MEDICINE | Facility: CLINIC | Age: 75
End: 2025-03-14
Payer: COMMERCIAL

## 2025-03-14 NOTE — TELEPHONE ENCOUNTER
Dr. Fink from [facility] calling to speak to Dr. Ayala in regards to patient. Did inform her provider was out today but could send her a message. Dr. Fink would like a callback to her cell phone at [phone] when SF is available.        NEGATIVE

## 2025-03-17 NOTE — TELEPHONE ENCOUNTER
Talked with Dr. Fink regarding patient. She feels her issues are related to her muscles. TMJ and chewing muscles. She is going to be seeing physical therapy.     Will be having imaging.

## 2025-03-24 ENCOUNTER — TELEPHONE (OUTPATIENT)
Dept: FAMILY MEDICINE | Facility: CLINIC | Age: 75
End: 2025-03-24
Payer: COMMERCIAL

## 2025-03-24 NOTE — TELEPHONE ENCOUNTER
The patient returned the call and will take the appointment tomorrow 3/25/25. I left the April appt in just in case she still needs it.    Ana

## 2025-03-25 ENCOUNTER — OFFICE VISIT (OUTPATIENT)
Dept: FAMILY MEDICINE | Facility: CLINIC | Age: 75
End: 2025-03-25
Payer: COMMERCIAL

## 2025-03-25 VITALS
RESPIRATION RATE: 16 BRPM | TEMPERATURE: 97.4 F | HEIGHT: 64 IN | BODY MASS INDEX: 23.73 KG/M2 | OXYGEN SATURATION: 98 % | SYSTOLIC BLOOD PRESSURE: 136 MMHG | WEIGHT: 139 LBS | DIASTOLIC BLOOD PRESSURE: 74 MMHG | HEART RATE: 85 BPM

## 2025-03-25 DIAGNOSIS — R30.0 DYSURIA: ICD-10-CM

## 2025-03-25 DIAGNOSIS — M79.10 MYALGIA: Primary | ICD-10-CM

## 2025-03-25 DIAGNOSIS — R53.83 OTHER FATIGUE: ICD-10-CM

## 2025-03-25 DIAGNOSIS — E03.8 SUBCLINICAL HYPOTHYROIDISM: ICD-10-CM

## 2025-03-25 DIAGNOSIS — E55.9 VITAMIN D DEFICIENCY: ICD-10-CM

## 2025-03-25 LAB
CK SERPL-CCNC: 97 U/L (ref 26–192)
T4 FREE SERPL-MCNC: 1.22 NG/DL (ref 0.9–1.7)
TSH SERPL DL<=0.005 MIU/L-ACNC: 4.31 UIU/ML (ref 0.3–4.2)
VIT B12 SERPL-MCNC: 595 PG/ML (ref 232–1245)
VIT D+METAB SERPL-MCNC: 26 NG/ML (ref 20–50)

## 2025-03-25 PROCEDURE — 36415 COLL VENOUS BLD VENIPUNCTURE: CPT | Performed by: FAMILY MEDICINE

## 2025-03-25 PROCEDURE — 82306 VITAMIN D 25 HYDROXY: CPT | Performed by: FAMILY MEDICINE

## 2025-03-25 PROCEDURE — 99214 OFFICE O/P EST MOD 30 MIN: CPT | Performed by: FAMILY MEDICINE

## 2025-03-25 PROCEDURE — 84439 ASSAY OF FREE THYROXINE: CPT | Performed by: FAMILY MEDICINE

## 2025-03-25 PROCEDURE — 3078F DIAST BP <80 MM HG: CPT | Performed by: FAMILY MEDICINE

## 2025-03-25 PROCEDURE — 1125F AMNT PAIN NOTED PAIN PRSNT: CPT | Performed by: FAMILY MEDICINE

## 2025-03-25 PROCEDURE — G2211 COMPLEX E/M VISIT ADD ON: HCPCS | Performed by: FAMILY MEDICINE

## 2025-03-25 PROCEDURE — 84443 ASSAY THYROID STIM HORMONE: CPT | Performed by: FAMILY MEDICINE

## 2025-03-25 PROCEDURE — 82550 ASSAY OF CK (CPK): CPT | Performed by: FAMILY MEDICINE

## 2025-03-25 PROCEDURE — 82607 VITAMIN B-12: CPT | Performed by: FAMILY MEDICINE

## 2025-03-25 PROCEDURE — 3075F SYST BP GE 130 - 139MM HG: CPT | Performed by: FAMILY MEDICINE

## 2025-03-25 RX ORDER — SULFAMETHOXAZOLE AND TRIMETHOPRIM 800; 160 MG/1; MG/1
1 TABLET ORAL 2 TIMES DAILY
Qty: 6 TABLET | Refills: 0 | Status: SHIPPED | OUTPATIENT
Start: 2025-03-25

## 2025-03-25 ASSESSMENT — PAIN SCALES - GENERAL: PAINLEVEL_OUTOF10: MODERATE PAIN (4)

## 2025-03-25 NOTE — PROGRESS NOTES
Assessment & Plan     Myalgia  Patient has continued muscle pains especially head and neck region.  She has been evaluated both here as well as at [place] and through [facility].  She is also pursued physical therapy as well as chiropractic care.  She reports she is feeling somewhat better in the head and neck region.  She has had some additional muscle aches and pains that have been throughout her body and she is somewhat concerned with that.  She has had some additional dry eyes and dry mouth that have occurred over the last couple weeks.  She has been utilizing [place] and heat to try to help with the aches and pains.  She will be continuing with physical therapy and chiropractic care to try to help with her pain as well.  Will check a CK to evaluate further and determine if other workup is needed at this point.  Encouraged her to continue with current care.  - CK total; Future  - CK total    Other fatigue  Patient has noticed a lot of fatigue during this time and is not sleeping the best due to the discomfort.  Will check her vitamin B12 to be sure this is not also an issue.  Will notify results.  - Vitamin B12; Future  - Vitamin B12    Subclinical hypothyroidism  Patient has history of elevated TSH with normal T4.  Will recheck again at this time in case this is contributing to her fatigue.  - TSH with free T4 reflex; Future  - TSH with free T4 reflex  - T4 free    Vitamin D deficiency  History of vitamin D deficiency.  Will check level today and then notify of results.  - Vitamin D Deficiency; Future  - Vitamin D Deficiency    Dysuria  Patient has history of recurrent UTIs.  She requests to have a prescription on hand for when she is out of town in case this becomes an issue.  She understands that if she is in town that would like to get a urine sample and a visit prior to any treatment.  Prescription given  - sulfamethoxazole-trimethoprim (BACTRIM DS) 800-160 MG tablet; Take 1 tablet by mouth 2  "times daily.                The longitudinal plan of care for the diagnosis(es)/condition(s) as documented were addressed during this visit. Due to the added complexity in care, I will continue to support Erinn in the subsequent management and with ongoing continuity of care.    Subjective   Erinn is a 75 year old, presenting for the following health issues:  Musculoskeletal Problem and Recheck Medication        3/25/2025    11:09 AM   Additional Questions   Roomed by Alyx Mejias CMA   Accompanied by self         3/25/2025    11:09 AM   Patient Reported Additional Medications   Patient reports taking the following new medications none     Musculoskeletal Problem    History of Present Illness       Back Pain:  She presents for follow up of back pain. Patient's back pain is a recurring problem.  Location of back pain:  Right middle of back, right side of neck, left side of neck, right shoulder and left shoulder  Description of back pain: dull ache and fullness  Back pain spreads: nowhere    Since patient first noticed back pain, pain is: unchanged  Does back pain interfere with her job:  No       Hypothyroidism:     Since last visit, patient describes the following symptoms::  Anxiety and Depression    Headaches:   Since the patient's last clinic visit, headaches are: no change  The patient is getting headaches:  Often  She is able to do normal daily activities when she has a migraine.  The patient is taking the following rescue/relief medications:  Ibuprofen (Advil, Motrin)   Patient states \"I get some relief\" from the rescue/relief medications.   The patient is taking the following medications to prevent migraines:  No medications to prevent migraines  In the past 4 weeks, the patient has gone to an Urgent Care or Emergency Room 0 times times due to headaches.She consumes 0 sweetened beverage(s) daily.She exercises with enough effort to increase her heart rate 9 or less minutes per day.  She exercises with enough " "effort to increase her heart rate 3 or less days per week.   She is taking medications regularly.      Was seen by ENT at [place] and then saw neurology.   Reports that neurology also didn't see anything.   CT scan was done. No abnormalities seen.     Was seen by physiatry.  She was referred to PT for concerns of muscular issue. Starts this tomorrow.     Was seen at [facility]. Also felt that this was musculoskeletal issue.     Noticing ear pain, neck pain, scalp pain.     Cardiologist has taken her off the statin last week.     Started seeing a chiropractor yesterday.  Working on some massage.     History of UTIs on occasion. Wants a prescription in case of issue when out of town.                      Objective    /74   Pulse 85   Temp 97.4  F (36.3  C) (Temporal)   Resp 16   Ht 1.626 m (5' 4.02\")   Wt 63 kg (139 lb)   LMP  (LMP Unknown)   SpO2 98%   Breastfeeding No   BMI 23.85 kg/m    Body mass index is 23.85 kg/m .  Physical Exam   GENERAL: alert and no distress  NECK: No asymmetry noted.  She has some tenderness over the trapezial ridges bilaterally but overall seems softer than on prior exam.  RESP: lungs clear to auscultation - no rales, rhonchi or wheezes  CV: regular rate and rhythm, normal S1 S2, no S3 or S4, no murmur, click or rub, no peripheral edema  MS: no gross musculoskeletal defects noted, no edema  PSYCH: mentation appears normal, affect normal/bright            Signed Electronically by: Brittnee Ayala MD    "

## 2025-04-08 ENCOUNTER — VIRTUAL VISIT (OUTPATIENT)
Dept: FAMILY MEDICINE | Facility: CLINIC | Age: 75
End: 2025-04-08
Payer: COMMERCIAL

## 2025-04-08 DIAGNOSIS — G44.219 EPISODIC TENSION-TYPE HEADACHE, NOT INTRACTABLE: ICD-10-CM

## 2025-04-08 DIAGNOSIS — J30.1 SEASONAL ALLERGIC RHINITIS DUE TO POLLEN: ICD-10-CM

## 2025-04-08 DIAGNOSIS — M79.10 MYALGIA: Primary | ICD-10-CM

## 2025-04-08 DIAGNOSIS — I25.2 HISTORY OF NON-ST ELEVATION MYOCARDIAL INFARCTION (NSTEMI): ICD-10-CM

## 2025-04-08 PROCEDURE — 98014 SYNCH AUDIO-ONLY EST MOD 30: CPT | Performed by: FAMILY MEDICINE

## 2025-04-08 NOTE — PROGRESS NOTES
Erinn is a 75 year old who is being evaluated via a billable telephone visit.    What phone number would you like to be contacted at? [phone]  How would you like to obtain your AVS? Mail a copy  Originating Location (pt. Location): Home    Distant Location (provider location):  On-site  Telephone visit completed due to the patient did not consent to a video visit.    Assessment & Plan     Myalgia  Episodic tension-type headache, not intractable  Patient wanted to follow-up regarding the myalgias and headache she has been experiencing  She has been seen by multiple providers including specialist through [facility] regarding her concerns.  Is felt to be most likely musculoskeletal in nature.  She has some TMJ symptoms as well as pain that is in the neck and then headaches as well.  Sinus issues have been ruled out by CT and she had seen ENT.  She has been seen by neuro and physical medicine.  Recommendations have been for physical therapy and she has been seen and is doing home exercises.  She has noticed some improvement over time.  She is noticing some decrease in her headaches in frequency and severity.  She has continued to use ice and/or heat.  She is not taking any over-the-counter medications.  She has a prescription for muscle relaxants and we did discuss potentially trying that at nighttime to see if that helped.  She has a mouthguard which seems to work fairly well for the clenching of her teeth.  It sounds as though she is trending in the right direction I recommend she continue with the physical therapy and the other measures as noted.  We discussed acupuncture as she has scheduled appointment for that and that seems reasonable as well.  We discussed the natural course of these types of things and that is not a straight line to improvement but may have some ups and downs.  If she is noticing significant worsening or new symptoms would like her to reach out otherwise have plan follow-up in 1 month.  Patient  "agrees to plan    History of non-ST elevation MI  We also discussed the use of statin for her.  Her cardiologist had her hold the statin and she thinks maybe she felt a little better from that.  We discussed waiting to hear back from cardiology regarding plan of other statin but do recommend that she be on a statin due to her prior heart disease.    Seasonal allergies  Patient has noted the last few years that the spring is a difficult time for her.  She is wondering what options she has for treatment and control of symptoms.  Discussed trial of Flonase which she is not sure works well for her.  She has only tried it on occasion and not on a regular basis.  Encouraged her to try it for a solid 2 weeks to see if it is helpful.  We also discussed use of Zyrtec or Allegra over-the-counter which she could try for symptoms.  We did discuss potential referral to allergy should these measures not work for her.          Manny Plasencia is a 75 year old, presenting for the following health issues:    - has a few questions but \"nothing super long\" and would prefer to speak with provider directly    Headache        3/25/2025    11:09 AM   Additional Questions   Roomed by Alyx Mejias CMA   Accompanied by self     History of Present Illness       Headaches:   Since the patient's last clinic visit, headaches are: no change  The patient is getting headaches:  Often  She is not able to do normal daily activities when she has a migraine.  The patient is taking the following rescue/relief medications:  Ibuprofen (Advil, Motrin)   Patient states \"I get only a small amount of relief\" from the rescue/relief medications.   The patient is taking the following medications to prevent migraines:  No medications to prevent migraines  In the past 4 weeks, the patient has gone to an Urgent Care or Emergency Room 0 times times due to headaches.    Reason for visit:  Follow up on head neck and headache    She eats 0-1 servings of fruits and " vegetables daily.She consumes 0 sweetened beverage(s) daily.She exercises with enough effort to increase her heart rate 10 to 19 minutes per day.  She exercises with enough effort to increase her heart rate 4 days per week.   She is taking medications regularly.        Reports she is doing a little bit better.     Started with chiropractor. Had 3 adjustments with 'the gun thing'. Only soft tissue maneuvers.  First treatment was helpful at first but then got worse.  She doesn't plan to continue with this.     Has had 2 physical therapy treatments. She is not sure if this has been helpful. Reports that she is doing the exercises at home.     Scheduled for acupuncture tomorrow.     Feels her neck is doing some better. Maybe 'under control'.     She has had some brain fog.      Headaches are improving. She has found a way to manage the headaches.     Allergies - saline nose spray. Eye wash. Has tried flonase nasal off and on. Hasn't tried any over the counter treatment.     Statin. Stopped because of the muscle pain.  Wonders if a little better off the statin. Brain fog is better. Waiting to hear from cardiology about a change. Asking about advanced lipid panel?                  Objective           Vitals:  No vitals were obtained today due to virtual visit.    Physical Exam   General: Alert and no distress //Respiratory: No audible wheeze, cough, or shortness of breath // Psychiatric:  Appropriate affect, tone, and pace of words            Phone call duration: 39 minutes      Signed Electronically by: Brittnee Ayala MD

## 2025-04-14 ENCOUNTER — TELEPHONE (OUTPATIENT)
Dept: FAMILY MEDICINE | Facility: CLINIC | Age: 75
End: 2025-04-14
Payer: COMMERCIAL

## 2025-04-14 NOTE — TELEPHONE ENCOUNTER
[place] APPROVAL FOR MRI FROM [facility]    See scanned document in media tab    Approval reference # : [id number]    1 MRI approval from dates of service 4/17/25-8/17/25    Sent mychart and copy to pt

## 2025-04-17 ENCOUNTER — TRANSFERRED RECORDS (OUTPATIENT)
Dept: HEALTH INFORMATION MANAGEMENT | Facility: CLINIC | Age: 75
End: 2025-04-17
Payer: COMMERCIAL

## 2025-04-22 ENCOUNTER — MYC MEDICAL ADVICE (OUTPATIENT)
Dept: FAMILY MEDICINE | Facility: CLINIC | Age: 75
End: 2025-04-22
Payer: COMMERCIAL

## 2025-05-12 SDOH — HEALTH STABILITY: PHYSICAL HEALTH: ON AVERAGE, HOW MANY MINUTES DO YOU ENGAGE IN EXERCISE AT THIS LEVEL?: 30 MIN

## 2025-05-12 SDOH — HEALTH STABILITY: PHYSICAL HEALTH: ON AVERAGE, HOW MANY DAYS PER WEEK DO YOU ENGAGE IN MODERATE TO STRENUOUS EXERCISE (LIKE A BRISK WALK)?: 5 DAYS

## 2025-05-12 ASSESSMENT — SOCIAL DETERMINANTS OF HEALTH (SDOH): HOW OFTEN DO YOU GET TOGETHER WITH FRIENDS OR RELATIVES?: MORE THAN THREE TIMES A WEEK

## 2025-05-13 ENCOUNTER — OFFICE VISIT (OUTPATIENT)
Dept: FAMILY MEDICINE | Facility: CLINIC | Age: 75
End: 2025-05-13
Attending: FAMILY MEDICINE
Payer: COMMERCIAL

## 2025-05-13 VITALS
RESPIRATION RATE: 17 BRPM | OXYGEN SATURATION: 97 % | BODY MASS INDEX: 23.99 KG/M2 | DIASTOLIC BLOOD PRESSURE: 78 MMHG | HEIGHT: 64 IN | HEART RATE: 83 BPM | TEMPERATURE: 97.3 F | WEIGHT: 140.5 LBS | SYSTOLIC BLOOD PRESSURE: 134 MMHG

## 2025-05-13 DIAGNOSIS — M54.2 NECK PAIN: ICD-10-CM

## 2025-05-13 DIAGNOSIS — Z79.899 ENCOUNTER FOR LONG-TERM (CURRENT) USE OF MEDICATIONS: ICD-10-CM

## 2025-05-13 DIAGNOSIS — I25.2 HISTORY OF NON-ST ELEVATION MYOCARDIAL INFARCTION (NSTEMI): ICD-10-CM

## 2025-05-13 DIAGNOSIS — M79.10 MYALGIA: ICD-10-CM

## 2025-05-13 DIAGNOSIS — M85.80 OSTEOPENIA AFTER MENOPAUSE: ICD-10-CM

## 2025-05-13 DIAGNOSIS — H93.8X3 PLUGGED FEELING IN EAR, BILATERAL: ICD-10-CM

## 2025-05-13 DIAGNOSIS — I10 PRIMARY HYPERTENSION: ICD-10-CM

## 2025-05-13 DIAGNOSIS — J30.1 SEASONAL ALLERGIC RHINITIS DUE TO POLLEN: ICD-10-CM

## 2025-05-13 DIAGNOSIS — E78.5 HYPERLIPIDEMIA LDL GOAL <70: ICD-10-CM

## 2025-05-13 DIAGNOSIS — Z00.00 ENCOUNTER FOR MEDICARE ANNUAL WELLNESS EXAM: Primary | ICD-10-CM

## 2025-05-13 DIAGNOSIS — Z78.0 OSTEOPENIA AFTER MENOPAUSE: ICD-10-CM

## 2025-05-13 DIAGNOSIS — Z23 NEED FOR VACCINATION: ICD-10-CM

## 2025-05-13 PROCEDURE — G0439 PPPS, SUBSEQ VISIT: HCPCS | Performed by: FAMILY MEDICINE

## 2025-05-13 PROCEDURE — 1126F AMNT PAIN NOTED NONE PRSNT: CPT | Performed by: FAMILY MEDICINE

## 2025-05-13 PROCEDURE — 3075F SYST BP GE 130 - 139MM HG: CPT | Performed by: FAMILY MEDICINE

## 2025-05-13 PROCEDURE — 36415 COLL VENOUS BLD VENIPUNCTURE: CPT | Performed by: FAMILY MEDICINE

## 2025-05-13 PROCEDURE — 80061 LIPID PANEL: CPT | Performed by: FAMILY MEDICINE

## 2025-05-13 PROCEDURE — 80053 COMPREHEN METABOLIC PANEL: CPT | Performed by: FAMILY MEDICINE

## 2025-05-13 PROCEDURE — 99214 OFFICE O/P EST MOD 30 MIN: CPT | Mod: 25 | Performed by: FAMILY MEDICINE

## 2025-05-13 PROCEDURE — 3078F DIAST BP <80 MM HG: CPT | Performed by: FAMILY MEDICINE

## 2025-05-13 PROCEDURE — G2211 COMPLEX E/M VISIT ADD ON: HCPCS | Performed by: FAMILY MEDICINE

## 2025-05-13 RX ORDER — MULTIVIT,TX WITH IRON,MINERALS
250 TABLET, EXTENDED RELEASE ORAL DAILY PRN
COMMUNITY

## 2025-05-13 RX ORDER — EZETIMIBE 10 MG/1
10 TABLET ORAL DAILY
COMMUNITY
Start: 2025-05-06

## 2025-05-13 ASSESSMENT — PAIN SCALES - GENERAL: PAINLEVEL_OUTOF10: NO PAIN (0)

## 2025-05-13 NOTE — PATIENT INSTRUCTIONS
Patient Education   Preventive Care Advice   This is general advice given by our system to help you stay healthy. However, your care team may have specific advice just for you. Please talk to your care team about your preventive care needs.  Nutrition  Eat 5 or more servings of fruits and vegetables each day.  Try wheat bread, brown rice and whole grain pasta (instead of white bread, rice, and pasta).  Get enough calcium and vitamin D. Check the label on foods and aim for 100% of the RDA (recommended daily allowance).  Lifestyle  Exercise at least 150 minutes each week  (30 minutes a day, 5 days a week).  Do muscle strengthening activities 2 days a week. These help control your weight and prevent disease.  No smoking.  Wear sunscreen to prevent skin cancer.  Have a dental exam and cleaning every 6 months.  Yearly exams  See your health care team every year to talk about:  Any changes in your health.  Any medicines your care team has prescribed.  Preventive care, family planning, and ways to prevent chronic diseases.  Shots (vaccines)   HPV shots (up to age 26), if you've never had them before.  Hepatitis B shots (up to age 59), if you've never had them before.  COVID-19 shot: Get this shot when it's due.  Flu shot: Get a flu shot every year.  Tetanus shot: Get a tetanus shot every 10 years.  Pneumococcal, hepatitis A, and RSV shots: Ask your care team if you need these based on your risk.  Shingles shot (for age 50 and up)  General health tests  Diabetes screening:  Starting at age 35, Get screened for diabetes at least every 3 years.  If you are younger than age 35, ask your care team if you should be screened for diabetes.  Cholesterol test: At age 39, start having a cholesterol test every 5 years, or more often if advised.  Bone density scan (DEXA): At age 50, ask your care team if you should have this scan for osteoporosis (brittle bones).  Hepatitis C: Get tested at least once in your life.  STIs (sexually  transmitted infections)  Before age 24: Ask your care team if you should be screened for STIs.  After age 24: Get screened for STIs if you're at risk. You are at risk for STIs (including HIV) if:  You are sexually active with more than one person.  You don't use condoms every time.  You or a partner was diagnosed with a sexually transmitted infection.  If you are at risk for HIV, ask about PrEP medicine to prevent HIV.  Get tested for HIV at least once in your life, whether you are at risk for HIV or not.  Cancer screening tests  Cervical cancer screening: If you have a cervix, begin getting regular cervical cancer screening tests starting at age 21.  Breast cancer scan (mammogram): If you've ever had breasts, begin having regular mammograms starting at age 40. This is a scan to check for breast cancer.  Colon cancer screening: It is important to start screening for colon cancer at age 45.  Have a colonoscopy test every 10 years (or more often if you're at risk) Or, ask your provider about stool tests like a FIT test every year or Cologuard test every 3 years.  To learn more about your testing options, visit:   .  For help making a decision, visit:   https://bit.ly/qt08326.  Prostate cancer screening test: If you have a prostate, ask your care team if a prostate cancer screening test (PSA) at age 55 is right for you.  Lung cancer screening: If you are a current or former smoker ages 50 to 80, ask your care team if ongoing lung cancer screenings are right for you.  For informational purposes only. Not to replace the advice of your health care provider. Copyright   2023 [facility] [employer]. All rights reserved. Clinically reviewed by the [facility] Transitions Program. [employer] [id number] - REV 01/24.  Hearing Loss: Care Instructions  Overview     Hearing loss is a sudden or slow decrease in how well you hear. It can range from slight to profound. Permanent hearing loss can occur with aging. It also can  happen when you are exposed long-term to loud noise. Examples include listening to loud music, riding motorcycles, or being around other loud machines.  Hearing loss can affect your work and home life. It can make you feel lonely or depressed. You may feel that you have lost your independence. But hearing aids and other devices can help you hear better and feel connected to others.  Follow-up care is a key part of your treatment and safety. Be sure to make and go to all appointments, and call your doctor if you are having problems. It's also a good idea to know your test results and keep a list of the medicines you take.  How can you care for yourself at home?  Avoid loud noises whenever possible. This helps keep your hearing from getting worse.  Always wear hearing protection around loud noises.  Wear a hearing aid as directed.  A professional can help you pick a hearing aid that will work best for you.  You can also get hearing aids over the counter for mild to moderate hearing loss.  Have hearing tests as your doctor suggests. They can show whether your hearing has changed. Your hearing aid may need to be adjusted.  Use other devices as needed. These may include:  Telephone amplifiers and hearing aids that can connect to a television, stereo, radio, or microphone.  Devices that use lights or vibrations. These alert you to the doorbell, a ringing telephone, or a baby monitor.  Television closed-captioning. This shows the words at the bottom of the screen. Most new TVs can do this.  TTY (text telephone). This lets you type messages back and forth on the telephone instead of talking or listening. These devices are also called TDD. When messages are typed on the keyboard, they are sent over the phone line to a receiving TTY. The message is shown on a monitor.  Use text messaging, social media, and email if it is hard for you to communicate by telephone.  Try to learn a listening technique called speechreading. It is  "not lipreading. You pay attention to people's gestures, expressions, posture, and tone of voice. These clues can help you understand what a person is saying. Face the person you are talking to, and have them face you. Make sure the lighting is good. You need to see the other person's face clearly.  Think about counseling if you need help to adjust to your hearing loss.  When should you call for help?  Watch closely for changes in your health, and be sure to contact your doctor if:    You think your hearing is getting worse.     You have new symptoms, such as dizziness or nausea.   Where can you learn more?  Go to [email or web address]/[email or web address]  Enter R798 in the search box to learn more about \"Hearing Loss: Care Instructions.\"  Current as of: October 27, 2024  Content Version: 14.4    8393-1548 [employer].   Care instructions adapted under license by your healthcare professional. If you have questions about a medical condition or this instruction, always ask your healthcare professional. [employer] disclaims any warranty or liability for your use of this information.    Learning About Stress  What is stress?     Stress is your body's response to a hard situation. Your body can have a physical, emotional, or mental response. Stress is a fact of life for most people, and it affects everyone differently. What causes stress for you may not be stressful for someone else.  A lot of things can cause stress. You may feel stress when you go on a job interview, take a test, or run a race. This kind of short-term stress is normal and even useful. It can help you if you need to work hard or react quickly. For example, stress can help you finish an important job on time.  Long-term stress is caused by ongoing stressful situations or events. Examples of long-term stress include long-term health problems, ongoing problems at work, or conflicts in your family. Long-term stress can harm your " health.  How does stress affect your health?  When you are stressed, your body responds as though you are in danger. It makes hormones that speed up your heart, make you breathe faster, and give you a burst of energy. This is called the fight-or-flight stress response. If the stress is over quickly, your body goes back to normal and no harm is done.  But if stress happens too often or lasts too long, it can have bad effects. Long-term stress can make you more likely to get sick, and it can make symptoms of some diseases worse. If you tense up when you are stressed, you may develop neck, shoulder, or low back pain. Stress is linked to high blood pressure and heart disease.  Stress also harms your emotional health. It can make you mattson, tense, or depressed. Your relationships may suffer, and you may not do well at work or school.  What can you do to manage stress?  You can try these things to help manage stress:   Do something active. Exercise or activity can help reduce stress. Walking is a great way to get started. Even everyday activities such as housecleaning or yard work can help.  Try yoga or kenny chi. These techniques combine exercise and meditation. You may need some training at first to learn them.  Do something you enjoy. For example, listen to music or go to a movie. Practice your hobby or do volunteer work.  Meditate. This can help you relax, because you are not worrying about what happened before or what may happen in the future.  Do guided imagery. Imagine yourself in any setting that helps you feel calm. You can use online videos, books, or a teacher to guide you.  Do breathing exercises. For example:  From a standing position, bend forward from the waist with your knees slightly bent. Let your arms dangle close to the floor.  Breathe in slowly and deeply as you return to a standing position. Roll up slowly and lift your head last.  Hold your breath for just a few seconds in the standing  "position.  Breathe out slowly and bend forward from the waist.  Let your feelings out. Talk, laugh, cry, and express anger when you need to. Talking with supportive friends or family, a counselor, or a eryn leader about your feelings is a healthy way to relieve stress. Avoid discussing your feelings with people who make you feel worse.  Write. It may help to write about things that are bothering you. This helps you find out how much stress you feel and what is causing it. When you know this, you can find better ways to cope.  What can you do to prevent stress?  You might try some of these things to help prevent stress:  Manage your time. This helps you find time to do the things you want and need to do.  Get enough sleep. Your body recovers from the stresses of the day while you are sleeping.  Get support. Your family, friends, and community can make a difference in how you experience stress.  Limit your news feed. Avoid or limit time on social media or news that may make you feel stressed.  Do something active. Exercise or activity can help reduce stress. Walking is a great way to get started.  Where can you learn more?  Go to https://www.frintit.net/patiented  Enter N032 in the search box to learn more about \"Learning About Stress.\"  Current as of: October 24, 2024  Content Version: 14.4 2024-2025 [employer].   Care instructions adapted under license by your healthcare professional. If you have questions about a medical condition or this instruction, always ask your healthcare professional. [employer] disclaims any warranty or liability for your use of this information.       "

## 2025-05-13 NOTE — PROGRESS NOTES
Preventive Care Visit  [facility] SRINIVAS Ayala MD, Family Medicine  May 13, 2025      Assessment & Plan     ASSESSMENT/ORDERS:    ICD-10-CM    1. Encounter for Medicare annual wellness exam  Z00.00       2. History of non-ST elevation myocardial infarction (NSTEMI)  I25.2       3. Primary hypertension  I10       4. Hyperlipidemia LDL goal <70  E78.5 Lipid panel reflex to direct LDL Fasting      5. Osteopenia after menopause  M85.80 DEXA HIP/PELVIS/SPINE - Future    Z78.0       6. Plugged feeling in ear, bilateral  H93.8X3       7. Myalgia  M79.10       8. Neck pain  M54.2       9. Seasonal allergic rhinitis due to pollen  J30.1       10. Encounter for long-term (current) use of medications  Z79.899 Comprehensive metabolic panel (BMP + Alb, Alk Phos, ALT, AST, Total. Bili, TP)      11. Need for vaccination  Z23           Assessment & Plan  Encounter for Medicare annual wellness exam:  Routine wellness examination.  Follow-up scheduled for next annual wellness exam in one year.    Hyperlipidemia LDL goal <70:  Currently taking Zetia 10 mg daily. Previous issues with statins causing muscle pains and Charley horses.  Recheck cholesterol in three months. Consider injectable options if needed.    Osteopenia after menopause:  History of osteopenia, no current osteoporosis medication.  Consider bone density testing to assess current status and potential need for vitamin D or calcium adjustments.    Plugged feeling in ear, bilateral:  Possible causes include muscle tension, stress, and seasonal allergies.  Continue current stress management techniques such as meditation. Consider trying allergy treatments if symptoms persist.    Myalgia/neck pain:  Muscle pains previously associated with statin use.  Monitor for improvement with current medication regimen.    Seasonal allergic rhinitis due to pollen:  Suspected seasonal allergies contributing to ear symptoms.  Consider allergy treatments if  symptoms persist.    Encounter for long-term (current) use of medications:  Current medications include Zetia, baby aspirin, and occasional use of vitamins and supplements.  Continue current medication regimen. Monitor for any side effects.    Need for vaccination:  Due for tetanus and shingles vaccines.  Consider tetanus and shingles vaccines. Discussed potential benefits of shingles vaccine in reducing Alzheimer's risk.    History of non-ST elevation MI  Continue care with cardiology  Plan for blood pressure and lipids as above.     Hypertension:  Doing well. Well controlled. Tolerating medication.  No change in plan.      The longitudinal plan of care for the diagnosis(es)/condition(s) as documented were addressed during this visit. Due to the added complexity in care, I will continue to support Erinn in the subsequent management and with ongoing continuity of care.      Consent was obtained from the patient to use an AI documentation tool in the creation of this note.            Counseling  Appropriate preventive services were addressed with this patient via screening, questionnaire, or discussion as appropriate for fall prevention, nutrition, physical activity, Tobacco-use cessation, social engagement, weight loss and cognition.  Checklist reviewing preventive services available has been given to the patient.  Reviewed patient's diet, addressing concerns and/or questions.   She is at risk for psychosocial distress and has been provided with information to reduce risk.   The patient was provided with written information regarding signs of hearing loss.           Manny Plasencia is a 75 year old, presenting for the following:  Wellness Visit        5/13/2025     8:13 AM   Additional Questions   Roomed by Bernadette HESTER   Accompanied by None         5/13/2025     8:13 AM   Patient Reported Additional Medications   Patient reports taking the following new medications VANDA Zamudio, a 75-year-old female,  reported improvement in her headaches and neck pain, suggesting that these symptoms may have been related to tension. However, she continues to experience plugged ears daily, which is particularly bothersome in the morning. She also has hearing loss, which exacerbates the issue. Erinn discovered glasses with integrated hearing aids and plans to acquire them as she dislikes traditional hearing aids. She visited [facility] for her ear issues but did not undergo extensive testing there. Her dentist discussed TMJ with her, noting that she does not grind her teeth but may clench them, which could contribute to her ear fullness due to pressure affecting the Eustachian tubes. Stress and tension were identified as potential factors for her symptoms.    Erinn has a history of muscle pains and Charley horses associated with statin use, which led to difficulty walking at times. She has been working with a cardiologist to find a suitable statin and is currently on a generic one for about a week without adverse effects. She previously experienced brain fog, causing concern due to her family history of Alzheimer's disease, but this has improved since discontinuing certain medications. Erinn takes baby aspirin daily and Zetia for cholesterol management. She also uses Nitro as needed and occasionally takes vitamins such as B12, D3, magnesium glycinate for constipation, and L-theanine for stress.    She mentioned spring allergies over the past few years but does not take medication for them. Erinn's dry mouth began around the same time as her statin-related issues, possibly linked to medication side effects. Her diet includes sugar and occasional donuts despite efforts to eat healthily. She noted that her skin felt painful during stressful periods.    Erinn's family history includes a cousin with breast cancer but no direct family history of the disease. She had a mammogram two years ago and osteopenia diagnosed years ago through  her gynecologist but has not pursued treatment or further testing for osteoporosis. 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    Hypertension Follow-up    Do you check your blood pressure regularly outside of the clinic? Yes   Are you following a low salt diet? Yes  Are your blood pressures ever more than 140 on the top number (systolic) OR more   than 90 on the bottom number (diastolic), for example 140/90? No    BP Readings from Last 2 Encounters:   05/13/25 134/78   03/25/25 136/74     Vascular Disease Follow-up    How often do you take nitroglycerin? Never  Do you take an aspirin every day? Yes      Advance Care Planning    Discussed advance care planning with patient; however, patient declined at this time.        5/12/2025   General Health   How would you rate your overall physical health? Good   Feel stress (tense, anxious, or unable to sleep) Rather much   (!) STRESS CONCERN      5/12/2025   Nutrition   Diet: Other   If other, please elaborate: Heart Healthy Diet         5/12/2025   Exercise   Days per week of moderate/strenous exercise 5 days   Average minutes spent exercising at this level 30 min         5/12/2025   Social Factors   Frequency of gathering with friends or relatives More than three times a week   Worry food won't last until get money to buy more No   Food not last or not have enough money for food? No   Do you have housing? (Housing is defined as stable permanent housing and does not include staying outside in a car, in a tent, in an abandoned building, in an overnight shelter, or couch-surfing.) Yes   Are you worried about losing your housing? No   Lack of transportation? No   Unable to get utilities (heat,electricity)? No         5/12/2025   Fall Risk   Fallen 2 or more times in the past year? No    No   Trouble with walking or balance? No    No       Multiple values from one day are sorted in  reverse-chronological order          5/12/2025   Activities of Daily Living- Home Safety   Needs help with the following daily activites None of the above   Safety concerns in the home None of the above         5/12/2025   Dental   Dentist two times every year? Yes         5/12/2025   Hearing Screening   Hearing concerns? (!) I NEED TO ASK PEOPLE TO SPEAK UP OR REPEAT THEMSELVES.    (!) TROUBLE UNDERSTANDING SOFT OR WHISPERED SPEECH.    (!) TROUBLE UNDERSTANDING SPEECH ON THE TELEPHONE       Multiple values from one day are sorted in reverse-chronological order         5/12/2025   Driving Risk Screening   Patient/family members have concerns about driving No         5/12/2025   General Alertness/Fatigue Screening   Have you been more tired than usual lately? No         5/12/2025   Urinary Incontinence Screening   Bothered by leaking urine in past 6 months No         Today's PHQ-2 Score:       5/13/2025     8:15 AM   PHQ-2 ( 1999 [employer])   Q1: Little interest or pleasure in doing things 0   Q2: Feeling down, depressed or hopeless 0   PHQ-2 Score 0           5/12/2025   Substance Use   Alcohol more than 3/day or more than 7/wk Not Applicable   Do you have a current opioid prescription? No   How severe/bad is pain from 1 to 10? 1/10   Do you use any other substances recreationally? No     Social History     Tobacco Use    Smoking status: Never     Passive exposure: Never    Smokeless tobacco: Never   Vaping Use    Vaping status: Never Used   Substance Use Topics    Alcohol use: No    Drug use: No          Mammogram Screening - After age 74- determine frequency with patient based on health status, life expectancy and patient goals    ASCVD Risk   The ASCVD Risk score (Audrey GARIBAY, et al., 2019) failed to calculate for the following reasons:    Risk score cannot be calculated because patient has a medical history suggesting prior/existing ASCVD            Reviewed and updated as needed this visit by Provider    "Tobacco  Allergies  Meds  Problems  Med Hx  Surg Hx  Fam Hx            BP Readings from Last 3 Encounters:   05/13/25 134/78   03/25/25 136/74   02/25/25 130/80    Wt Readings from Last 3 Encounters:   05/13/25 63.7 kg (140 lb 8 oz)   03/25/25 63 kg (139 lb)   02/25/25 64.3 kg (141 lb 12.8 oz)                  Current providers sharing in care for this patient include:  Patient Care Team:  Brittnee Ayala MD as PCP - General  Brittnee Ayala MD as Assigned PCP  Dayron Geronimo MD as MD (Otolaryngology)    The following health maintenance items are reviewed in Epic and correct as of today:  Health Maintenance   Topic Date Due    DTAP/TDAP/TD IMMUNIZATION (1 - Tdap) Never done    ZOSTER IMMUNIZATION (1 of 2) Never done    DEXA  11/06/2021    ANNUAL REVIEW OF HM ORDERS  02/14/2024    COVID-19 Vaccine (7 - 2024-25 season) 09/01/2024    RSV VACCINE (1 - 1-dose 75+ series) Never done    BMP  04/30/2025    LIPID  08/27/2025    INFLUENZA VACCINE (Season Ended) 09/01/2025    TSH W/FREE T4 REFLEX  03/25/2026    MEDICARE ANNUAL WELLNESS VISIT  05/13/2026    FALL RISK ASSESSMENT  05/13/2026    DIABETES SCREENING  04/30/2027    ADVANCE CARE PLANNING  05/13/2030    COLORECTAL CANCER SCREENING  03/08/2033    HEPATITIS C SCREENING  Completed    PHQ-2 (once per calendar year)  Completed    Pneumococcal Vaccine: 50+ Years  Completed    HPV IMMUNIZATION  Aged Out    MENINGITIS IMMUNIZATION  Aged Out    MAMMO SCREENING  Discontinued            Objective    Exam  /78   Pulse 83   Temp 97.3  F (36.3  C) (Temporal)   Resp 17   Ht 1.626 m (5' 4.02\")   Wt 63.7 kg (140 lb 8 oz)   LMP  (LMP Unknown)   SpO2 97%   BMI 24.10 kg/m     Estimated body mass index is 24.1 kg/m  as calculated from the following:    Height as of this encounter: 1.626 m (5' 4.02\").    Weight as of this encounter: 63.7 kg (140 lb 8 oz).    Physical Exam  GENERAL: alert and no distress  EYES: Eyes grossly normal to inspection, PERRL and " conjunctivae and sclerae normal  HENT: ear canals and TM's normal, nose and mouth without ulcers or lesions  NECK: no adenopathy, no asymmetry, masses, or scars  RESP: lungs clear to auscultation - no rales, rhonchi or wheezes  CV: regular rate and rhythm, normal S1 S2, no S3 or S4, no murmur, click or rub, no peripheral edema  ABDOMEN: soft, nontender, no hepatosplenomegaly, no masses and bowel sounds normal  MS: no gross musculoskeletal defects noted, no edema  SKIN: no suspicious lesions or rashes  NEURO: Normal strength and tone, mentation intact and speech normal  PSYCH: mentation appears normal, affect normal/bright         5/13/2025   Mini Cog   Clock Draw Score 2 Normal   3 Item Recall 3 objects recalled   Mini Cog Total Score 5              Signed Electronically by: Brittnee Ayala MD

## 2025-05-14 ENCOUNTER — RESULTS FOLLOW-UP (OUTPATIENT)
Dept: FAMILY MEDICINE | Facility: CLINIC | Age: 75
End: 2025-05-14

## 2025-05-14 DIAGNOSIS — E78.5 HYPERLIPIDEMIA LDL GOAL <70: Primary | ICD-10-CM

## 2025-05-14 LAB
ALBUMIN SERPL BCG-MCNC: 4 G/DL (ref 3.5–5.2)
ALP SERPL-CCNC: 76 U/L (ref 40–150)
ALT SERPL W P-5'-P-CCNC: 24 U/L (ref 0–50)
ANION GAP SERPL CALCULATED.3IONS-SCNC: 9 MMOL/L (ref 7–15)
AST SERPL W P-5'-P-CCNC: 33 U/L (ref 0–45)
BILIRUB SERPL-MCNC: 0.4 MG/DL
BUN SERPL-MCNC: 11.2 MG/DL (ref 8–23)
CALCIUM SERPL-MCNC: 8.5 MG/DL (ref 8.8–10.4)
CHLORIDE SERPL-SCNC: 106 MMOL/L (ref 98–107)
CHOLEST SERPL-MCNC: 184 MG/DL
CREAT SERPL-MCNC: 0.77 MG/DL (ref 0.51–0.95)
EGFRCR SERPLBLD CKD-EPI 2021: 80 ML/MIN/1.73M2
FASTING STATUS PATIENT QL REPORTED: YES
FASTING STATUS PATIENT QL REPORTED: YES
GLUCOSE SERPL-MCNC: 88 MG/DL (ref 70–99)
HCO3 SERPL-SCNC: 25 MMOL/L (ref 22–29)
HDLC SERPL-MCNC: 60 MG/DL
LDLC SERPL CALC-MCNC: 106 MG/DL
NONHDLC SERPL-MCNC: 124 MG/DL
POTASSIUM SERPL-SCNC: 4.6 MMOL/L (ref 3.4–5.3)
PROT SERPL-MCNC: 6.8 G/DL (ref 6.4–8.3)
SODIUM SERPL-SCNC: 140 MMOL/L (ref 135–145)
TRIGL SERPL-MCNC: 88 MG/DL

## 2025-08-12 ENCOUNTER — LAB (OUTPATIENT)
Dept: LAB | Facility: CLINIC | Age: 75
End: 2025-08-12
Payer: COMMERCIAL

## 2025-08-12 ENCOUNTER — DOCUMENTATION ONLY (OUTPATIENT)
Dept: LAB | Facility: CLINIC | Age: 75
End: 2025-08-12

## 2025-08-12 DIAGNOSIS — Z79.899 ENCOUNTER FOR LONG-TERM (CURRENT) USE OF MEDICATIONS: ICD-10-CM

## 2025-08-12 DIAGNOSIS — E78.5 HYPERLIPIDEMIA LDL GOAL <70: ICD-10-CM

## 2025-08-12 DIAGNOSIS — Z79.899 ENCOUNTER FOR LONG-TERM (CURRENT) USE OF MEDICATIONS: Primary | ICD-10-CM

## 2025-08-12 LAB
ALBUMIN SERPL BCG-MCNC: 4 G/DL (ref 3.5–5.2)
ALP SERPL-CCNC: 81 U/L (ref 40–150)
ALT SERPL W P-5'-P-CCNC: 22 U/L (ref 0–50)
AST SERPL W P-5'-P-CCNC: 33 U/L (ref 0–45)
BILIRUB SERPL-MCNC: 0.5 MG/DL
BILIRUBIN DIRECT (ROCHE PRO & PURE): 0.18 MG/DL (ref 0–0.45)
CHOLEST SERPL-MCNC: 199 MG/DL
FASTING STATUS PATIENT QL REPORTED: YES
HDLC SERPL-MCNC: 50 MG/DL
HOLD SPECIMEN: NORMAL
LDLC SERPL CALC-MCNC: 125 MG/DL
NONHDLC SERPL-MCNC: 149 MG/DL
PROT SERPL-MCNC: 6.7 G/DL (ref 6.4–8.3)
TRIGL SERPL-MCNC: 122 MG/DL

## 2025-08-12 PROCEDURE — 36415 COLL VENOUS BLD VENIPUNCTURE: CPT

## 2025-08-12 PROCEDURE — 80061 LIPID PANEL: CPT

## 2025-08-12 PROCEDURE — 80076 HEPATIC FUNCTION PANEL: CPT
